# Patient Record
Sex: FEMALE | Race: WHITE | Employment: FULL TIME | URBAN - METROPOLITAN AREA
[De-identification: names, ages, dates, MRNs, and addresses within clinical notes are randomized per-mention and may not be internally consistent; named-entity substitution may affect disease eponyms.]

---

## 2017-03-13 LAB — TYPE, ABO & RH, EXTERNAL: NORMAL

## 2017-11-13 LAB
HBSAG, EXTERNAL: NEGATIVE
HIV, EXTERNAL: NEGATIVE
RUBELLA, EXTERNAL: POSITIVE

## 2018-01-01 ENCOUNTER — APPOINTMENT (OUTPATIENT)
Dept: GENERAL RADIOLOGY | Age: 28
End: 2018-01-01
Attending: EMERGENCY MEDICINE
Payer: COMMERCIAL

## 2018-01-01 ENCOUNTER — HOSPITAL ENCOUNTER (EMERGENCY)
Age: 28
Discharge: HOME | End: 2018-01-01
Attending: EMERGENCY MEDICINE
Payer: COMMERCIAL

## 2018-01-01 VITALS
HEIGHT: 67 IN | SYSTOLIC BLOOD PRESSURE: 159 MMHG | BODY MASS INDEX: 21.97 KG/M2 | OXYGEN SATURATION: 100 % | HEART RATE: 73 BPM | TEMPERATURE: 97.6 F | RESPIRATION RATE: 16 BRPM | DIASTOLIC BLOOD PRESSURE: 76 MMHG | WEIGHT: 140 LBS

## 2018-01-01 DIAGNOSIS — S61.412A LACERATION OF LEFT HAND, FOREIGN BODY PRESENCE UNSPECIFIED, INITIAL ENCOUNTER: Primary | ICD-10-CM

## 2018-01-01 PROCEDURE — 77030018836 HC SOL IRR NACL ICUM -A

## 2018-01-01 PROCEDURE — 75810000293 HC SIMP/SUPERF WND  RPR

## 2018-01-01 PROCEDURE — 73130 X-RAY EXAM OF HAND: CPT

## 2018-01-01 PROCEDURE — 74011000250 HC RX REV CODE- 250: Performed by: PHYSICIAN ASSISTANT

## 2018-01-01 PROCEDURE — 99282 EMERGENCY DEPT VISIT SF MDM: CPT

## 2018-01-01 PROCEDURE — 77030031132 HC SUT NYL COVD -A

## 2018-01-01 RX ORDER — CEPHALEXIN 500 MG/1
500 CAPSULE ORAL 3 TIMES DAILY
Qty: 21 CAP | Refills: 0 | Status: SHIPPED | OUTPATIENT
Start: 2018-01-01 | End: 2018-01-08

## 2018-01-01 RX ADMIN — Medication 2 ML: at 17:31

## 2018-01-01 NOTE — ED TRIAGE NOTES
Pt cut her left hand/ left lower second finger with a steak knife. Controlled bleeding noted. Pt states she is 16 weeks pregnant.

## 2018-01-01 NOTE — LETTER
[facility] 
[facility] EMERGENCY DEPT 
[address] [id number] 
[phone] Work/School Note Date: 1/1/2018 To Whom It May concern: 
 
Daria Stahl was seen and treated today in the emergency room by the following provider(s): 
Attending Provider: Darlene Johnson MD 
Physician Assistant: LACY Khoury. Daria Stahl may return to work on 1/11/18 or when sutures are removed.  
 
Sincerely, 
 
 
 
 
LACY Khoury

## 2018-01-01 NOTE — ED NOTES
Patient given discharge instruction by [facility] Adriana CARABALLO. Verbalized understanding, pt discharge home with family. Non stick dressing applied to the left hand.

## 2018-01-01 NOTE — ED PROVIDER NOTES
HPI Comments: Genaro Urrutia is a 24 y.o. female  who presents by private vehicle to ER with c/o laceration. Patient was trying to wedge something out of her kids toy using a steak knife and stabbed her left hand below the 2nd finger on her palm. Patient is UTD on immunizations. Ptient is 16 weeks pregnant, denies any pregnancy complaints. She specifically denies any fevers, chills, nausea, vomiting, chest pain, shortness of breath, headache, rash, diarrhea, abdominal pain, urinary/bowel changes, sweating or weight loss. PCP: Ela Sagastume MD   PMHx significant for: Past Medical History:  No date: HX OTHER MEDICAL      Comment: jaw surgery; bone graft in jaw   PSHx significant for: Past Surgical History:  No date: HX APPENDECTOMY  Social Hx: Tobacco use: Smoking status: Never Smoker                                                              Smokeless status: Never Used                      ; EtOH use: The patient states she drinks 0 per week.; Illicit Drug use: Allergies:   -- Morphine -- Nausea and Vomiting    There are no other complaints, changes or physical findings at this time. The history is provided by the patient.         Past Medical History:   Diagnosis Date    HX OTHER MEDICAL     jaw surgery; bone graft in jaw       Past Surgical History:   Procedure Laterality Date    HX APPENDECTOMY           Family History:   Problem Relation Age of Onset    Hypertension Paternal Grandfather        Social History     Social History    Marital status:      Spouse name: Laura Linda Number of children: 0    Years of education: N/A     Occupational History    Student           Social History Main Topics    Smoking status: Never Smoker    Smokeless tobacco: Never Used    Alcohol use No    Drug use: No    Sexual activity: Yes     Partners: Male     Other Topics Concern    Not on file     Social History Narrative         ALLERGIES: Morphine    Review of Systems Constitutional: Negative. HENT: Negative. Eyes: Negative. Respiratory: Negative. Cardiovascular: Negative. Gastrointestinal: Negative. Endocrine: Negative. Genitourinary: Negative. Musculoskeletal: Negative. Skin: Positive for wound. Allergic/Immunologic: Negative. Neurological: Negative. Hematological: Negative. Psychiatric/Behavioral: Negative. All other systems reviewed and are negative. Vitals:    01/01/18 1652   BP: 159/76   Pulse: 73   Resp: 16   Temp: 97.6 °F (36.4 °C)   SpO2: 100%   Weight: 63.5 kg (140 lb)   Height: 5' 7\" (1.702 m)            Physical Exam   Constitutional: She is oriented to person, place, and time. She appears well-developed. HENT:   Head: Normocephalic and atraumatic. Right Ear: External ear normal.   Left Ear: External ear normal.   Nose: Nose normal.   Mouth/Throat: Oropharynx is clear and moist. No oropharyngeal exudate. Eyes: Conjunctivae, EOM and lids are normal. Right eye exhibits no discharge. Left eye exhibits no discharge. Neck: Normal range of motion. No tracheal deviation present. No thyromegaly present. Cardiovascular: Normal rate, regular rhythm, normal heart sounds and intact distal pulses. Pulmonary/Chest: Effort normal and breath sounds normal.   Abdominal: Soft. Normal appearance and bowel sounds are normal.   Musculoskeletal: Normal range of motion. Hands:  Neurological: She is alert and oriented to person, place, and time. Skin: Skin is warm and dry. Psychiatric: She has a normal mood and affect. Judgment normal.        MDM  Number of Diagnoses or Management Options  Laceration of left hand, foreign body presence unspecified, initial encounter:   Diagnosis management comments: Assesment/Plan- 24 y.o. No chief complaint on file. differential includes: laceration. imaging reviewed with no acute findings. Wound approximated with sutures. Recommend PCP follow up.  Patient educated on reasons to return to the ED. Amount and/or Complexity of Data Reviewed  Tests in the radiology section of CPT®: reviewed and ordered      ED Course       Wound Repair  Date/Time: 1/2/2018 12:50 AM  [id number]: Chapito De Paz  Preparation: skin prepped with Shur-Clens  Pre-procedure re-eval: Immediately prior to the procedure, the patient was reevaluated and found suitable for the planned procedure and any planned medications. Time out: Immediately prior to the procedure a time out was called to verify the correct patient, procedure, equipment, staff and marking as appropriate. .  Location details: left hand  Wound length:2.5 cm or less  Anesthesia: local infiltration    Anesthesia:  Local Anesthetic: LET (lido,epi,tetracaine)  Anesthetic total: 2 mL  Foreign bodies: no foreign bodies  Irrigation solution: saline  Irrigation method: syringe  Debridement: none  Skin closure: 5-0 nylon  Number of sutures: 3  Technique: simple  Approximation: close  Dressing: 4x4  Patient tolerance: Patient tolerated the procedure well with no immediate complications  My total time at bedside, performing this procedure was 16-30 minutes.

## 2018-01-01 NOTE — DISCHARGE INSTRUCTIONS
Cuts on the Hand Closed With Stitches: Care Instructions  Your Care Instructions    A cut on your hand can be on your fingers, your thumb, or the front or back of your hand. Sometimes a cut can injure the tendons, blood vessels, or nerves of your hand. The doctor used stitches to close the cut. Using stitches also helps the cut heal and reduces scarring. The doctor may have given you a splint to help prevent you from moving your hand, fingers, or thumb. If the cut went deep and through the skin, the doctor put in two layers of stitches. The deeper layer brings the deep part of the cut together. These stitches will dissolve and don't need to be removed. The stitches in the upper layer are the ones you see on the cut. You will probably have a bandage. You will need to have the stitches removed, usually in 7 to 14 days. The doctor may suggest that you see a hand specialist if the cut is very deep or if you have trouble moving your fingers or have less feeling in your hand. The doctor has checked you carefully, but problems can develop later. If you notice any problems or new symptoms, get medical treatment right away. Follow-up care is a key part of your treatment and safety. Be sure to make and go to all appointments, and call your doctor if you are having problems. It's also a good idea to know your test results and keep a list of the medicines you take. How can you care for yourself at home? · Keep the cut dry for the first 24 to 48 hours. After this, you can shower if your doctor okays it. Pat the cut dry. · Don't soak the cut, such as in a bathtub. Your doctor will tell you when it's safe to get the cut wet. · If your doctor told you how to care for your cut, follow your doctor's instructions. If you did not get instructions, follow this general advice:  ¨ After the first 24 to 48 hours, wash around the cut with clean water 2 times a day.  Don't use hydrogen peroxide or alcohol, which can slow healing. ¨ You may cover the cut with a thin layer of petroleum jelly, such as Vaseline, and a nonstick bandage. ¨ Apply more petroleum jelly and replace the bandage as needed. · Prop up the sore hand on a pillow anytime you sit or lie down during the next 3 days. Try to keep it above the level of your heart. This will help reduce swelling. · Avoid any activity that could cause your cut to reopen. · Do not remove the stitches on your own. Your doctor will tell you when to come back to have the stitches removed. · Be safe with medicines. Take pain medicines exactly as directed. ¨ If the doctor gave you a prescription medicine for pain, take it as prescribed. ¨ If you are not taking a prescription pain medicine, ask your doctor if you can take an over-the-counter medicine. When should you call for help? Call your doctor now or seek immediate medical care if:  ? · You have new pain, or your pain gets worse. ? · The skin near the cut is cold or pale or changes color. ? · You have tingling, weakness, or numbness near the cut.   ? · The cut starts to bleed, and blood soaks through the bandage. Oozing small amounts of blood is normal.   ? · You have trouble moving the area of the hand near the cut.   ? · You have symptoms of infection, such as:  ¨ Increased pain, swelling, warmth, or redness around the cut. ¨ Red streaks leading from the cut. ¨ Pus draining from the cut. ¨ A fever. ? Watch closely for changes in your health, and be sure to contact your doctor if:  ? · You do not get better as expected. Where can you learn more? Go to http://burke-armando.info/. Enter T250 in the search box to learn more about \"Cuts on the Hand Closed With Stitches: Care Instructions. \"  Current as of: March 20, 2017  Content Version: 11.4  © 6582-1763 [employer]  Care instructions adapted under license by [employer] (which disclaims liability or warranty for this information). If you have questions about a medical condition or this instruction, always ask your healthcare professional. [address] any warranty or liability for your use of this information. We hope that we have addressed all of your medical concerns. The examination and treatment you received in the Emergency Department were for an emergent problem and were not intended as complete care. It is important that you follow up with your healthcare provider(s) for ongoing care. If your symptoms worsen or do not improve as expected, and you are unable to reach your usual health care provider(s), you should return to the Emergency Department. Today's healthcare is undergoing tremendous change, and patient satisfaction surveys are one of the many tools to assess the quality of medical care. You may receive a survey from the [employer] regarding your experience in the Emergency Department. I hope that your experience has been completely positive, particularly the medical care that I provided. As such, please participate in the survey; anything less than excellent does not meet my expectations or intentions. [facility] and [facility] participate in nationally recognized quality of care measures. If your blood pressure is greater than 120/80, as reported below, we urge that you seek medical care to address the potential of high blood pressure, commonly known as hypertension. Hypertension can be hereditary or can be caused by certain medical conditions, pain, stress, or \"white coat syndrome. \"       Please make an appointment with your health care provider(s) for follow up of your Emergency Department visit. VITALS:   Patient Vitals for the past 8 hrs:   Temp Pulse Resp BP SpO2   01/01/18 1652 97.6 °F (36.4 °C) 73 16 159/76 100 %          Thank you for allowing us to provide you with medical care today.   We realize that you have many choices for your emergency care needs. Please choose us in the future for any continued health care needs. Adelina Berger, [address] Vandana Singh: [phone]            No results found for this or any previous visit (from the past 24 hour(s)). Xr Hand Lt Min 3 V    Result Date: 1/1/2018  EXAM:  XR HAND LT MIN 3 V INDICATION:  Knife went into the left hand. Evaluation for open fracture. COMPARISON: None. FINDINGS: Three views of the left hand demonstrate no fracture, dislocation or other acute osseous or articular abnormality. The soft tissues are within normal limits. There is no evidence for retained radiopaque foreign body. IMPRESSION:  No evidence for an open fracture.

## 2018-03-27 LAB
ANTIBODY SCREEN, EXTERNAL: NEGATIVE
T. PALLIDUM, EXTERNAL: NORMAL

## 2018-04-10 ENCOUNTER — HOSPITAL ENCOUNTER (OUTPATIENT)
Age: 28
Setting detail: OBSERVATION
Discharge: HOME | End: 2018-04-11
Attending: OBSTETRICS & GYNECOLOGY | Admitting: OBSTETRICS & GYNECOLOGY
Payer: COMMERCIAL

## 2018-04-10 LAB
APPEARANCE UR: CLEAR
BACTERIA URNS QL MICRO: NEGATIVE /HPF
BILIRUB UR QL: NEGATIVE
COLOR UR: NORMAL
EPITH CASTS URNS QL MICRO: NORMAL /LPF
GLUCOSE UR STRIP.AUTO-MCNC: NEGATIVE MG/DL
HGB UR QL STRIP: NEGATIVE
HYALINE CASTS URNS QL MICRO: NORMAL /LPF (ref 0–5)
KETONES UR QL STRIP.AUTO: NEGATIVE MG/DL
LEUKOCYTE ESTERASE UR QL STRIP.AUTO: NEGATIVE
NITRITE UR QL STRIP.AUTO: NEGATIVE
PH UR STRIP: 7 [PH] (ref 5–8)
PROT UR STRIP-MCNC: NEGATIVE MG/DL
RBC #/AREA URNS HPF: NORMAL /HPF (ref 0–5)
SP GR UR REFRACTOMETRY: 1.02 (ref 1–1.03)
UA: UC IF INDICATED,UAUC: NORMAL
UROBILINOGEN UR QL STRIP.AUTO: 0.2 EU/DL (ref 0.2–1)
WBC URNS QL MICRO: NORMAL /HPF (ref 0–4)

## 2018-04-10 PROCEDURE — 81001 URINALYSIS AUTO W/SCOPE: CPT | Performed by: OBSTETRICS & GYNECOLOGY

## 2018-04-10 PROCEDURE — 99218 HC RM OBSERVATION: CPT

## 2018-04-10 PROCEDURE — 96360 HYDRATION IV INFUSION INIT: CPT

## 2018-04-10 PROCEDURE — 74011250636 HC RX REV CODE- 250/636: Performed by: OBSTETRICS & GYNECOLOGY

## 2018-04-10 RX ORDER — TERBUTALINE SULFATE 1 MG/ML
0.25 INJECTION SUBCUTANEOUS
Status: COMPLETED | OUTPATIENT
Start: 2018-04-11 | End: 2018-04-11

## 2018-04-10 RX ORDER — SODIUM CHLORIDE, SODIUM LACTATE, POTASSIUM CHLORIDE, CALCIUM CHLORIDE 600; 310; 30; 20 MG/100ML; MG/100ML; MG/100ML; MG/100ML
500 INJECTION, SOLUTION INTRAVENOUS ONCE
Status: COMPLETED | OUTPATIENT
Start: 2018-04-10 | End: 2018-04-10

## 2018-04-10 RX ADMIN — SODIUM CHLORIDE, SODIUM LACTATE, POTASSIUM CHLORIDE, AND CALCIUM CHLORIDE 999 ML: 600; 310; 30; 20 INJECTION, SOLUTION INTRAVENOUS at 23:16

## 2018-04-10 NOTE — IP AVS SNAPSHOT
[facility] 
 
 
 [address] 
[phone] Patient: Fransisco Paulson MRN: [id number] UCK:[birth year] A check malorie indicates which time of day the medication should be taken. My Medications ASK your doctor about these medications Instructions Each Dose to Equal  
 Morning Noon Evening Bedtime PNV38-Iron Cbn&Gluc-FA-DSS-DHA 35-1- mg Cmpk Take 1 Tab by mouth. 1 Tab

## 2018-04-10 NOTE — IP AVS SNAPSHOT
[address] [facility] 
 
 
 [address] [place] [address] 
[phone] Patient: Richardson Argueta MRN: [id number] IBH:[id number]/[id number] About your hospitalization You were admitted on:  N/A You last received care in the:  [facility] LABOR & DELIVERY You were discharged on:  April 11, 2018 Why you were hospitalized Your primary diagnosis was:  Not on File Follow-up Information Follow up With Details Comments Contact Info Galo Gallagher MD   [address] [facility] [address] [place] [address] 
[phone] Melissa Sue MD Go today for routine follow up appointment. Call office overnight if contractions begin regularly again. [address] 
[phone] Discharge Orders None A check malorie indicates which time of day the medication should be taken. My Medications ASK your doctor about these medications Instructions Each Dose to Equal  
 Morning Noon Evening Bedtime PNV38-Iron Cbn&Gluc-FA-DSS-DHA 35-1- mg Cmpk Take 1 Tab by mouth. 1 Tab Discharge Instructions Weeks 30 to 32 of Your Pregnancy: Care Instructions Your Care Instructions You have made it to the final months of your pregnancy. By now, your baby is really starting to look like a baby, with hair and plump skin. As you enter the final weeks of pregnancy, the reality of having a baby may start to set in. This is the time to settle on a name, get your household in order, set up a safe nursery, and find quality  if needed. Doing these things in advance will allow you to focus on caring for and enjoying your new baby.  You may also want to have a tour of your hospital's labor and delivery unit to get a better idea of what to expect while you are in the hospital. 
 During these last months, it is very important to take good care of yourself and pay attention to what your body needs. If your doctor says it is okay for you to work, don't push yourself too hard. Use the tips provided in this care sheet to ease heartburn and care for varicose veins. If you haven't already had the Tdap shot during this pregnancy, talk to your doctor about getting it. It will help protect your  against pertussis infection. Follow-up care is a key part of your treatment and safety. Be sure to make and go to all appointments, and call your doctor if you are having problems. It's also a good idea to know your test results and keep a list of the medicines you take. How can you care for yourself at home? Pay attention to your baby's movements · You should feel your baby move several times every day. · Your baby now turns less, and kicks and jabs more. · Your baby sleeps 20 to 45 minutes at a time and is more active at certain times of day. · If your doctor wants you to count your baby's kicks: 
¨ Empty your bladder, and lie on your side or relax in a comfortable chair. ¨ Write down your start time. ¨ Pay attention only to your baby's movements. Count any movement except hiccups. ¨ After you have counted 10 movements, write down your stop time. ¨ Write down how many minutes it took for your baby to move 10 times. ¨ If an hour goes by and you have not recorded 10 movements, have something to eat or drink and then count for another hour. If you do not record 10 movements in either hour, call your doctor. Ease heartburn · Eat small, frequent meals. · Do not eat chocolate, peppermint, or very spicy foods. Avoid drinks with caffeine, such as coffee, tea, and sodas. · Avoid bending over or lying down after meals. · Talk a short walk after you eat. · If heartburn is a problem at night, do not eat for 2 hours before bedtime. · Take antacids like Mylanta, Maalox, Rolaids, or Tums. Do not take antacids that have sodium bicarbonate. Care for varicose veins · Varicose veins are blood vessels that stretch out with the extra blood during pregnancy. Your legs may ache or throb. Most varicose veins will go away after the birth. · Avoid standing for long periods of time. Sit with your legs crossed at the ankles, not the knees. · Sit with your feet propped up. · Avoid tight clothing or stockings. Wear support hose. · Exercise regularly. Try walking for at least 30 minutes a day. Where can you learn more? Go to http://burke-armando.info/. Enter N569 in the search box to learn more about \"Weeks 30 to 32 of Your Pregnancy: Care Instructions. \" Current as of: 2017 Content Version: 11.4 © 2850-7532 [employer]. Care instructions adapted under license by World[employer]inger (which disclaims liability or warranty for this information). If you have questions about a medical condition or this instruction, always ask your healthcare professional. [place] [address] any warranty or liability for your use of this information.  Labor: Care Instructions Your Care Instructions  labor is the start of labor between 20 and 36 weeks of pregnancy. A full-term pregnancy lasts 37 to 42 weeks. In labor, the uterus contracts to open the cervix. This is the first stage of childbirth.  labor can be caused by a problem with the baby, the mother, or both. Often the cause is not known. In some cases, doctors use medicines to try to delay labor until 29 or more weeks of pregnancy. By this time, a baby has grown enough so that problems are not likely. In some cases-such as with a serious infection-it is healthier for the baby to be born early. Your treatment will depend on how far along you are in your pregnancy and on your health and your baby's health. Follow-up care is a key part of your treatment and safety. Be sure to make and go to all appointments, and call your doctor if you are having problems. It's also a good idea to know your test results and keep a list of the medicines you take. How can you care for yourself at home? · If your doctor prescribed medicines, take them exactly as directed. Call your doctor if you think you are having a problem with your medicine. · Rest until your doctor advises you about activity. He or she will tell you if you should stay in bed most of the time. You may need to arrange for  if you have young children. · Do not have sexual intercourse unless your doctor says it is safe. · Use pads, not tampons, if you have vaginal bleeding. · Make sure to drink plenty of fluids. Dehydration can lead to contractions. If you have kidney, heart, or liver disease and have to limit fluids, talk with your doctor before you increase the amount of fluids you drink. · Do not smoke or allow others to smoke around you. If you need help quitting, talk to your doctor about stop-smoking programs and medicines. These can increase your chances of quitting for good. When should you call for help? Call 911 anytime you think you may need emergency care. For example, call if: 
? · You passed out (lost consciousness). ? · You have severe vaginal bleeding. ? · You have severe pain in your belly or pelvis. ? · You have had fluid gushing or leaking from your vagina and you know or think the umbilical cord is bulging into your vagina. If this happens, immediately get down on your knees so your rear end (buttocks) is higher than your head. This will decrease the pressure on the cord until help arrives. ?Call your doctor now or seek immediate medical care if: 
? · You have signs of preeclampsia, such as: 
¨ Sudden swelling of your face, hands, or feet. ¨ New vision problems (such as dimness or blurring). ¨ A severe headache. ? · You have any vaginal bleeding. ? · You have belly pain or cramping. ? · You have a fever. ? · You have had regular contractions (with or without pain) for an hour. This means that you have 6 or more within 1 hour after you change your position and drink fluids. ? · You have a sudden release of fluid from the vagina. ? · You have low back pain or pelvic pressure that does not go away. ? · You notice that your baby has stopped moving or is moving much less than normal. ? Watch closely for changes in your health, and be sure to contact your doctor if you have any problems. Where can you learn more? Go to http://burke-armando.info/. Enter Q400 in the search box to learn more about \" Labor: Care Instructions. \" Current as of: 2017 Content Version: 11.4 © 0917-8578 [email or web address]. Care instructions adapted under license by Peak (which disclaims liability or warranty for this information). If you have questions about a medical condition or this instruction, always ask your healthcare professional. [place] [address] any warranty or liability for your use of this information. Pandya Browner Contractions: Care Instructions Your Care Instructions Berks Tuttle contractions prepare your uterus for labor. Think of them as a \"warm-up\" exercise that your body does. You may begin to feel them between the 28th and 30th weeks of your pregnancy. But they start as early as the 20th week. Berks Tuttle contractions usually occur more often during the ninth month. They may go away when you are active and return when you rest. These contractions are like mild contractions of true labor, but they occur less often. (You feel fewer than 8 in an hour.) They don't cause your cervix to open. It may be hard for you to tell the difference between Pandya Browner contractions and true labor, especially in your first pregnancy. Follow-up care is a key part of your treatment and safety. Be sure to make and go to all appointments, and call your doctor if you are having problems. It's also a good idea to know your test results and keep a list of the medicines you take. How can you care for yourself at home? · Try a warm bath to help relieve muscle tension and reduce pain. · Change positions every 30 minutes. Take breaks if you must sit for a long time. Get up and walk around. · Drink plenty of water, enough so that your urine is light yellow or clear like water. · Taking short walks may help you feel better. Your doctor needs to check any contractions that are getting stronger or closer together. Where can you learn more? Go to http://burke-armando.info/. Enter 620 [id number] in the search box to learn more about \"Hendricks Tuttle Contractions: Care Instructions. \" Current as of: 2017 Content Version: 11.4 © 0044-5048 Anim[employer]. Care instructions adapted under license by Drybar (which disclaims liability or warranty for this information). If you have questions about a medical condition or this instruction, always ask your healthcare professional. [place] [address] any warranty or liability for your use of this information. Pregnancy Precautions: Care Instructions Your Care Instructions There is no sure way to prevent labor before your due date ( labor) or to prevent most other pregnancy problems. But there are things you can do to increase your chances of a healthy pregnancy. Go to your appointments, follow your doctor's advice, and take good care of yourself. Eat well, and exercise (if your doctor agrees). And make sure to drink plenty of water. Follow-up care is a key part of your treatment and safety.  Be sure to make and go to all appointments, and call your doctor if you are having problems. It's also a good idea to know your test results and keep a list of the medicines you take. How can you care for yourself at home? · Make sure you go to your prenatal appointments. At each visit, your doctor will check your blood pressure. Your doctor will also check to see if you have protein in your urine. High blood pressure and protein in urine are signs of preeclampsia. This condition can be dangerous for you and your baby. · Drink plenty of fluids, enough so that your urine is light yellow or clear like water. Dehydration can cause contractions. If you have kidney, heart, or liver disease and have to limit fluids, talk with your doctor before you increase the amount of fluids you drink. · Tell your doctor right away if you notice any symptoms of an infection, such as: ¨ Burning when you urinate. ¨ A foul-smelling discharge from your vagina. ¨ Vaginal itching. ¨ Unexplained fever. ¨ Unusual pain or soreness in your uterus or lower belly. · Eat a balanced diet. Include plenty of foods that are high in calcium and iron. ¨ Foods high in calcium include milk, cheese, yogurt, almonds, and broccoli. ¨ Foods high in iron include red meat, shellfish, poultry, eggs, beans, raisins, whole-grain bread, and leafy green vegetables. · Do not smoke. If you need help quitting, talk to your doctor about stop-smoking programs and medicines. These can increase your chances of quitting for good. · Do not drink alcohol or use illegal drugs. · Follow your doctor's directions about activity. Your doctor will let you know how much, if any, exercise you can do. · Ask your doctor if you can have sex. If you are at risk for early labor, your doctor may ask you to not have sex. · Take care to prevent falls. During pregnancy, your joints are loose, and your balance is off. Sports such as bicycling, skiing, or in-line skating can increase your risk of falling.  And don't ride horses or motorcycles, dive, water ski, scuba dive, or parachute jump while you are pregnant. · Avoid getting very hot. Do not use saunas or hot tubs. Avoid staying out in the sun in hot weather for long periods. Take acetaminophen (Tylenol) to lower a high fever. · Do not take any over-the-counter or herbal medicines or supplements without talking to your doctor or pharmacist first. 
When should you call for help? Call 911 anytime you think you may need emergency care. For example, call if: 
? · You passed out (lost consciousness). ? · You have severe vaginal bleeding. ? · You have severe pain in your belly or pelvis. ? · You have had fluid gushing or leaking from your vagina and you know or think the umbilical cord is bulging into your vagina. If this happens, immediately get down on your knees so your rear end (buttocks) is higher than your head. This will decrease the pressure on the cord until help arrives. ?Call your doctor now or seek immediate medical care if: 
? · You have signs of preeclampsia, such as: 
¨ Sudden swelling of your face, hands, or feet. ¨ New vision problems (such as dimness or blurring). ¨ A severe headache. ? · You have any vaginal bleeding. ? · You have belly pain or cramping. ? · You have a fever. ? · You have had regular contractions (with or without pain) for an hour. This means that you have 8 or more within 1 hour or 4 or more in 20 minutes after you change your position and drink fluids. ? · You have a sudden release of fluid from your vagina. ? · You have low back pain or pelvic pressure that does not go away. ? · You notice that your baby has stopped moving or is moving much less than normal. ? Watch closely for changes in your health, and be sure to contact your doctor if you have any problems. Where can you learn more? Go to http://burke-armando.info/. Enter [id number] in the search box to learn more about \"Pregnancy Precautions: Care Instructions. \" 
 Current as of: March 16, 2017 Content Version: 11.4 © 4991-6714 Healthwise, Tuolar.com. Care instructions adapted under license by [employer] (which disclaims liability or warranty for this information). If you have questions about a medical condition or this instruction, always ask your healthcare professional. [address] any warranty or liability for your use of this information. Introducing [facility]! [employer] introduces [employer] patient portal. Now you can access parts of your medical record, email your doctor's office, and request medication refills online. 1. In your internet browser, go to [email or web address] 2. Click on the First Time User? Click Here link in the Sign In box. You will see the New Member Sign Up page. 3. Enter your [employer] Access Code exactly as it appears below. You will not need to use this code after youve completed the sign-up process. If you do not sign up before the expiration date, you must request a new code. · [employer] Access Code: [email or web address] Expires: 7/10/2018  1:00 AM 
 
4. Enter the last four digits of your Social Security Number (xxxx) and Date of Birth (mm/dd/yyyy) as indicated and click Submit. You will be taken to the next sign-up page. 5. Create a [employer] ID. This will be your [employer] login ID and cannot be changed, so think of one that is secure and easy to remember. 6. Create a [employer] password. You can change your password at any time. 7. Enter your Password Reset Question and Answer. This can be used at a later time if you forget your password. 8. Enter your e-mail address. You will receive e-mail notification when new information is available in [address] 9. Click Sign Up. You can now view and download portions of your medical record. 10. Click the Download Summary menu link to download a portable copy of your medical information. If you have questions, please visit the Frequently Asked Questions section of the MyChart website. Remember, Massively Funt is NOT to be used for urgent needs. For medical emergencies, dial 911. Now available from your iPhone and Android! Introducing Filemon Anderson As a [employer] patient, I wanted to make you aware of our electronic visit tool called Filemon Anderson. [employer] 24/7 allows you to connect within minutes with a medical provider 24 hours a day, seven days a week via a mobile device or tablet or logging into a secure website from your computer. You can access Filemon Anderson from anywhere in the [place]. A virtual visit might be right for you when you have a simple condition and feel like you just dont want to get out of bed, or cant get away from work for an appointment, when your regular [employer] provider is not available (evenings, weekends or holidays), or when youre out of town and need minor care. Electronic visits cost only $49 and if the [employer] 24/7 provider determines a prescription is needed to treat your condition, one can be electronically transmitted to a nearby pharmacy*. Please take a moment to enroll today if you have not already done so. The enrollment process is free and takes just a few minutes. To enroll, please download the [employer] 24/7 kandi to your tablet or phone, or visit [email or web address] to enroll on your computer. And, as an [address] [place] [address] patient with a Blitsy account, the results of your visits will be scanned into your electronic medical record and your primary care provider will be able to view the scanned results. We urge you to continue to see your regular [employer] provider for your ongoing medical care.   And while your primary care provider may not be the one available when you seek a Filemon Anderson virtual visit, the peace of mind you get from getting a real diagnosis real time can be priceless. For more information on Filemon Anderson, view our Frequently Asked Questions (FAQs) at [email or web address]. Sincerely, 
 
Rodrigo Field MD 
Chief Medical Officer [address] *:  certain medications cannot be prescribed via Filemon Anderson Providers Seen During Your Hospitalization Provider Specialty Primary office phone Nathalia Rothman MD Obstetrics & Gynecology [phone] Your Primary Care Physician (PCP) Primary Care Physician Office Phone Office Fax Yu Gracia [phone] You are allergic to the following Allergen Reactions Morphine Nausea and Vomiting Recent Documentation Breastfeeding? OB Status Smoking Status No Pregnant Never Smoker Emergency Contacts Name Discharge Info Relation Home Work Mobile Nimesh Hensley DISCHARGE CAREGIVER [3] Spouse [3] [phone] Patient Belongings The following personal items are in your possession at time of discharge: 
  Dental Appliances: None  Visual Aid: Contacts      Home Medications: None   Jewelry: None  Clothing: At bedside    Other Valuables: None Please provide this summary of care documentation to your next provider. Signatures-by signing, you are acknowledging that this After Visit Summary has been reviewed with you and you have received a copy. Patient Signature:  ____________________________________________________________ Date:  ____________________________________________________________  
  
[facility] Provider Signature:  ____________________________________________________________ Date:  ____________________________________________________________

## 2018-04-11 VITALS
DIASTOLIC BLOOD PRESSURE: 66 MMHG | SYSTOLIC BLOOD PRESSURE: 118 MMHG | TEMPERATURE: 98.2 F | OXYGEN SATURATION: 98 % | RESPIRATION RATE: 16 BRPM | HEART RATE: 78 BPM

## 2018-04-11 PROBLEM — O47.9 IRREGULAR CONTRACTIONS: Status: ACTIVE | Noted: 2018-04-11

## 2018-04-11 LAB — FIBRONECTIN FETAL VAG QL: NEGATIVE

## 2018-04-11 PROCEDURE — 82731 ASSAY OF FETAL FIBRONECTIN: CPT | Performed by: OBSTETRICS & GYNECOLOGY

## 2018-04-11 PROCEDURE — 74011250636 HC RX REV CODE- 250/636: Performed by: OBSTETRICS & GYNECOLOGY

## 2018-04-11 PROCEDURE — 96372 THER/PROPH/DIAG INJ SC/IM: CPT

## 2018-04-11 PROCEDURE — 59025 FETAL NON-STRESS TEST: CPT

## 2018-04-11 PROCEDURE — 99285 EMERGENCY DEPT VISIT HI MDM: CPT

## 2018-04-11 PROCEDURE — 99218 HC RM OBSERVATION: CPT

## 2018-04-11 RX ADMIN — TERBUTALINE SULFATE 0.25 MG: 1 INJECTION SUBCUTANEOUS at 00:05

## 2018-04-11 NOTE — H&P
History & Physical    Name: Marcelle Plascencia MRN: [id number]  SSN: [id number]    YOB: 1990  Age: 34 y.o. Sex: female        Subjective:     Estimated Date of Delivery: 18  OB History      Para Term  AB Living    2 1 1   1    SAB TAB Ectopic Molar Multiple Live Births         1          Ms. Jeffery Mills presents with pregnancy at 30w0d for decrease fetal movement. She reports feeling occasional braxtons blum; denies any vaginal bleeding, leakage of fluid. She reports working a long day today. Prenatal course was normal. Please see prenatal records for details. Past Medical History:   Diagnosis Date    HX OTHER MEDICAL     jaw surgery; bone graft in jaw     Past Surgical History:   Procedure Laterality Date    HX APPENDECTOMY       Social History     Occupational History    Student           Social History Main Topics    Smoking status: Never Smoker    Smokeless tobacco: Never Used    Alcohol use No    Drug use: No    Sexual activity: Yes     Partners: Male     Family History   Problem Relation Age of Onset    Hypertension Paternal Grandfather        Allergies   Allergen Reactions    Morphine Nausea and Vomiting     Prior to Admission medications    Medication Sig Start Date End Date Taking? Authorizing Provider   PNV38-Iron Cbn&Gluc-FA-DSS-DHA 35-1- mg cmpk Take 1 Tab by mouth. Phys Other, MD        A comprehensive review of systems was negative except for that written in the HPI. Objective:     Vitals: There were no vitals filed for this visit.      Physical Exam  Gen: NAD  Pulm: CTA B/L  CV: S1S2  Abd: Gravid,soft, NT  Pelvic: NO VB  Cervical Exam: closed/thick/high  Uterine Activity: +contractions  Membranes: Intact  Fetal Heart Rate: Reactive     Prenatal Labs:   Lab Results   Component Value Date/Time    Rubella, External Immune 2013    GrBStrep, External Negative 2013    HBsAg, External Negative 2013    HIV, External Non-reactive 2013    Gonorrhea, External Negative 2013    Chlamydia, External Negative 2013        Impression/Plan: 30.0 weeks gestation, Decrease Fetal Movement,  contractions     Plan: Reactive  NST. Patient reports feeling and noticing fetal movements. Encourage increase hydration. FFN negative, Maintain scheduled OB appointments.      Signed By:  Doc Burns MD     April 10, 2018

## 2018-04-11 NOTE — PROGRESS NOTES
2150: patient arrived ambulatory to floor, sates that she has had some fetal movement, but it has been significantly decreased over the last few days, states that she has had some contractions as well and just an \"overall crampy\" feeling. Denies leaking of fluid or bleeding. Patient state that she has been working and on her feet a lot over the last few days as well. 2230: called Dmitry MELVIN to update on patient - per MD continue to monitor and MD will be down to see patient shortly. 2300: Dmitry MELVIN at bedside discussing history with patient, per MD - give 500 ml LR bolus, continue monitoring. Will check cervix before patient leaves. 2350: per MD - give 1 dose 0.25 terbutaline and reevaluate in 20 minutes  0005: Dmitry MELVIN at bedside for assessment, SVE 0/40/floating, FFN sent to lab per MD order. 0050: lab called, FFN negative, confirmed by Belem Correa RN.   5502: spoke Jose Otoole MD - per MD TORJOHNY - patient may be discharged home with work modifications, rest, and adequate hydration. Continue to follow up with MD tomorrow morning. 0115: discharge instructions above reviewed with patient, questions thoroughly answered and patient discharged home to self care.

## 2018-05-21 LAB — GRBS, EXTERNAL: NEGATIVE

## 2018-05-24 ENCOUNTER — HOSPITAL ENCOUNTER (EMERGENCY)
Age: 28
Discharge: HOME | End: 2018-05-25
Attending: OBSTETRICS & GYNECOLOGY | Admitting: OBSTETRICS & GYNECOLOGY
Payer: COMMERCIAL

## 2018-05-24 LAB
APPEARANCE UR: CLEAR
BACTERIA URNS QL MICRO: NEGATIVE /HPF
BILIRUB UR QL: NEGATIVE
COLOR UR: NORMAL
DAILY QC (YES/NO)?: YES
EPITH CASTS URNS QL MICRO: NORMAL /LPF
GLUCOSE UR STRIP.AUTO-MCNC: NEGATIVE MG/DL
HGB UR QL STRIP: NEGATIVE
HYALINE CASTS URNS QL MICRO: NORMAL /LPF (ref 0–5)
KETONES UR QL STRIP.AUTO: NEGATIVE MG/DL
LEUKOCYTE ESTERASE UR QL STRIP.AUTO: NEGATIVE
NITRITE UR QL STRIP.AUTO: NEGATIVE
PH UR STRIP: 7 [PH] (ref 5–8)
PH, VAGINAL FLUID: 5 (ref 5–6.1)
PROT UR STRIP-MCNC: NEGATIVE MG/DL
RBC #/AREA URNS HPF: NORMAL /HPF (ref 0–5)
SP GR UR REFRACTOMETRY: 1.01 (ref 1–1.03)
UA: UC IF INDICATED,UAUC: NORMAL
UROBILINOGEN UR QL STRIP.AUTO: 0.2 EU/DL (ref 0.2–1)
WBC URNS QL MICRO: NORMAL /HPF (ref 0–4)

## 2018-05-24 PROCEDURE — 83986 ASSAY PH BODY FLUID NOS: CPT | Performed by: OBSTETRICS & GYNECOLOGY

## 2018-05-24 PROCEDURE — 36415 COLL VENOUS BLD VENIPUNCTURE: CPT | Performed by: OBSTETRICS & GYNECOLOGY

## 2018-05-24 PROCEDURE — 81001 URINALYSIS AUTO W/SCOPE: CPT | Performed by: OBSTETRICS & GYNECOLOGY

## 2018-05-24 NOTE — IP AVS SNAPSHOT
[address] [address] [address] [facility] 
[phone] Patient: Rajeev Candelario MRN: [id number] PFJ: About your hospitalization You were admitted on:  N/A You last received care in the:  [facility] 2 LABOR & DELIVERY You were discharged on:  May 25, 2018 Why you were hospitalized Your primary diagnosis was:  Not on File Follow-up Information None Discharge Orders None A check malorie indicates which time of day the medication should be taken. My Medications ASK your doctor about these medications Instructions Each Dose to Equal  
 Morning Noon Evening Bedtime PNV38-Iron Cbn&Gluc-FA-DSS-DHA 35-1- mg Cmpk Your last dose was: Your next dose is: Take 1 Tab by mouth. 1 Tab Discharge Instructions Weeks 34 to 36 of Your Pregnancy: Care Instructions Your Care Instructions By now, your baby and your belly have grown quite large. It is almost time to give birth. A full-term pregnancy can deliver between 37 and 42 weeks. Your baby's lungs are almost ready to breathe air. The bones in your baby's head are now firm enough to protect it, but soft enough to move down through the birth canal. 
You may feel excited, happy, anxious, or scared. You may wonder how you will know if you are in labor or what to expect during labor. Try to be flexible in your expectations of the birth. Because each birth is different, there is no way to know exactly what childbirth will be like for you. This care sheet will help you know what to expect and how to prepare. This may make your childbirth easier. If you haven't already had the Tdap shot during this pregnancy, talk to your doctor about getting it. It will help protect your  against pertussis infection. In the 36th week, most women have a test for group B streptococcus (GBS). GBS is a common bacteria that can live in the vagina and rectum. It can make your baby sick after birth. If you test positive, you will get antibiotics during labor. The medicine will keep your baby from getting the bacteria. Follow-up care is a key part of your treatment and safety. Be sure to make and go to all appointments, and call your doctor if you are having problems. It's also a good idea to know your test results and keep a list of the medicines you take. How can you care for yourself at home? Learn about pain relief choices · Pain is different for every woman. Talk with your doctor about your feelings about pain. · You can choose from several types of pain relief. These include medicine or breathing techniques, as well as comfort measures. You can use more than one option. · If you choose to have pain medicine during labor, talk to your doctor about your options. Some medicines lower anxiety and help with some of the pain. Others make your lower body numb so that you won't feel pain. · Be sure to tell your doctor about your pain medicine choice before you start labor or very early in your labor. You may be able to change your mind as labor progresses. · Rarely, a woman is put to sleep by medicine given through a mask or an IV. Labor and delivery · The first stage of labor has three parts: early, active, and transition. ¨ Most women have early labor at home. You can stay busy or rest, eat light snacks, drink clear fluids, and start counting contractions. ¨ When talking during a contraction gets hard, you may be moving to active labor. During active labor, you should head for the hospital if you are not there already. ¨ You are in active labor when contractions come every 3 to 4 minutes and last about 60 seconds. Your cervix is opening more rapidly. ¨ If your water breaks, contractions will come faster and stronger.  
¨ During transition, your cervix is stretching, and contractions are coming more rapidly. ¨ You may want to push, but your cervix might not be ready. Your doctor will tell you when to push. · The second stage starts when your cervix is completely opened and you are ready to push. ¨ Contractions are very strong to push the baby down the birth canal. 
¨ You will feel the urge to push. You may feel like you need to have a bowel movement. ¨ You may be coached to push with contractions. These contractions will be very strong, but you will not have them as often. You can get a little rest between contractions. ¨ You may be emotional and irritable. You may not be aware of what is going on around you. ¨ One last push, and your baby is born. · The third stage is when a few more contractions push out the placenta. This may take 30 minutes or less. · The fourth stage is the welcome recovery. You may feel overwhelmed with emotions and exhausted but alert. This is a good time to start breastfeeding. Where can you learn more? Go to http://burke-armando.info/. Enter K123 in the search box to learn more about \"Weeks 34 to 36 of Your Pregnancy: Care Instructions. \" Current as of: 2017 Content Version: 11.4 © 6243-8848 [employer]. Care instructions adapted under license by [employer] (which disclaims liability or warranty for this information). If you have questions about a medical condition or this instruction, always ask your healthcare professional. [address] [address] any warranty or liability for your use of this information. Pregnancy Precautions: Care Instructions Your Care Instructions There is no sure way to prevent labor before your due date ( labor) or to prevent most other pregnancy problems. But there are things you can do to increase your chances of a healthy pregnancy. Go to your appointments, follow your doctor's advice, and take good care of yourself. Eat well, and exercise (if your doctor agrees). And make sure to drink plenty of water. Follow-up care is a key part of your treatment and safety. Be sure to make and go to all appointments, and call your doctor if you are having problems. It's also a good idea to know your test results and keep a list of the medicines you take. How can you care for yourself at home? · Make sure you go to your prenatal appointments. At each visit, your doctor will check your blood pressure. Your doctor will also check to see if you have protein in your urine. High blood pressure and protein in urine are signs of preeclampsia. This condition can be dangerous for you and your baby. · Drink plenty of fluids, enough so that your urine is light yellow or clear like water. Dehydration can cause contractions. If you have kidney, heart, or liver disease and have to limit fluids, talk with your doctor before you increase the amount of fluids you drink. · Tell your doctor right away if you notice any symptoms of an infection, such as: ¨ Burning when you urinate. ¨ A foul-smelling discharge from your vagina. ¨ Vaginal itching. ¨ Unexplained fever. ¨ Unusual pain or soreness in your uterus or lower belly. · Eat a balanced diet. Include plenty of foods that are high in calcium and iron. ¨ Foods high in calcium include milk, cheese, yogurt, almonds, and broccoli. ¨ Foods high in iron include red meat, shellfish, poultry, eggs, beans, raisins, whole-grain bread, and leafy green vegetables. · Do not smoke. If you need help quitting, talk to your doctor about stop-smoking programs and medicines. These can increase your chances of quitting for good. · Do not drink alcohol or use illegal drugs. · Follow your doctor's directions about activity. Your doctor will let you know how much, if any, exercise you can do. · Ask your doctor if you can have sex. If you are at risk for early labor, your doctor may ask you to not have sex. · Take care to prevent falls. During pregnancy, your joints are loose, and your balance is off. Sports such as bicycling, skiing, or in-line skating can increase your risk of falling. And don't ride horses or motorcycles, dive, water ski, scuba dive, or parachute jump while you are pregnant. · Avoid getting very hot. Do not use saunas or hot tubs. Avoid staying out in the sun in hot weather for long periods. Take acetaminophen (Tylenol) to lower a high fever. · Do not take any over-the-counter or herbal medicines or supplements without talking to your doctor or pharmacist first. 
When should you call for help? Call 911 anytime you think you may need emergency care. For example, call if: 
? · You passed out (lost consciousness). ? · You have severe vaginal bleeding. ? · You have severe pain in your belly or pelvis. ? · You have had fluid gushing or leaking from your vagina and you know or think the umbilical cord is bulging into your vagina. If this happens, immediately get down on your knees so your rear end (buttocks) is higher than your head. This will decrease the pressure on the cord until help arrives. ?Call your doctor now or seek immediate medical care if: 
? · You have signs of preeclampsia, such as: 
¨ Sudden swelling of your face, hands, or feet. ¨ New vision problems (such as dimness or blurring). ¨ A severe headache. ? · You have any vaginal bleeding. ? · You have belly pain or cramping. ? · You have a fever. ? · You have had regular contractions (with or without pain) for an hour. This means that you have 8 or more within 1 hour or 4 or more in 20 minutes after you change your position and drink fluids. ? · You have a sudden release of fluid from your vagina. ? · You have low back pain or pelvic pressure that does not go away.   
? · You notice that your baby has stopped moving or is moving much less than normal.  
 ?Watch closely for changes in your health, and be sure to contact your doctor if you have any problems. Where can you learn more? Go to http://burke-armando.info/. Enter [id number] in the search box to learn more about \"Pregnancy Precautions: Care Instructions. \" Current as of: March 16, 2017 Content Version: 11.4 © 9453-5332 [employer]. Care instructions adapted under license by [email or web address] (which disclaims liability or warranty for this information). If you have questions about a medical condition or this instruction, always ask your healthcare professional. [address] any warranty or liability for your use of this information. Counting Your Baby's Kicks: Care Instructions Your Care Instructions Counting your baby's kicks is one way your doctor can tell that your baby is healthy. Most women-especially in a first pregnancy-feel their baby move for the first time between 16 and 22 weeks. The movement may feel like flutters rather than kicks. Your baby may move more at certain times of the day. When you are active, you may notice less kicking than when you are resting. At your prenatal visits, your doctor will ask whether the baby is active. In your last trimester, your doctor may ask you to count the number of times you feel your baby move. Follow-up care is a key part of your treatment and safety. Be sure to make and go to all appointments, and call your doctor if you are having problems. It's also a good idea to know your test results and keep a list of the medicines you take. How do you count fetal kicks? · A common method of checking your baby's movement is to count the number of kicks or moves you feel in 1 hour. Ten movements (such as kicks, flutters, or rolls) in 1 hour are normal. Some doctors suggest that you count in the morning until you get to 10 movements. Then you can quit for that day and start again the next day. · Pick your baby's most active time of day to count. This may be any time from morning to evening. · If you do not feel 10 movements in an hour, your baby may be sleeping. Wait for the next hour and count again. When should you call for help? Call your doctor now or seek immediate medical care if: 
? · You noticed that your baby has stopped moving or is moving much less than normal. ? Watch closely for changes in your health, and be sure to contact your doctor if you have any problems. Where can you learn more? Go to http://burke-armando.info/. Enter S854 in the search box to learn more about \"Counting Your Baby's Kicks: Care Instructions. \" Current as of: March 16, 2017 Content Version: 11.4 © 1945-9348 [employer]. Care instructions adapted under license by [employer] (which disclaims liability or warranty for this information). If you have questions about a medical condition or this instruction, always ask your healthcare professional. [place] [address] any warranty or liability for your use of this information. Introducing [facility]! [employer] introduces [employer] Services patient portal. Now you can access parts of your medical record, email your doctor's office, and request medication refills online. 1. In your internet browser, go to [email or web address] 2. Click on the First Time User? Click Here link in the Sign In box. You will see the New Member Sign Up page. 3. Enter your Prizm [employer] Services Access Code exactly as it appears below. You will not need to use this code after youve completed the sign-up process. If you do not sign up before the expiration date, you must request a new code. · [employer] Access Code: [email or web address] Expires: 7/10/2018  1:00 AM 
 
4. Enter the last four digits of your Social Security Number (xxxx) and Date of Birth (mm/dd/yyyy) as indicated and click Submit.  You will be taken to the next sign-up page. 5. Create a Medcurrentt ID. This will be your rumr login ID and cannot be changed, so think of one that is secure and easy to remember. 6. Create a Medcurrentt password. You can change your password at any time. 7. Enter your Password Reset Question and Answer. This can be used at a later time if you forget your password. 8. Enter your e-mail address. You will receive e-mail notification when new information is available in [address] 9. Click Sign Up. You can now view and download portions of your medical record. 10. Click the Download Summary menu link to download a portable copy of your medical information. If you have questions, please visit the Frequently Asked Questions section of the rumr website. Remember, rumr is NOT to be used for urgent needs. For medical emergencies, dial 911. Now available from your iPhone and Android! Introducing Filemon Anderson As a [facility] patient, I wanted to make you aware of our electronic visit tool called Filemon Yenrobbyojana. [facility] 24/7 allows you to connect within minutes with a medical provider 24 hours a day, seven days a week via a mobile device or tablet or logging into a secure website from your computer. You can access Filemon Anderson from anywhere in the [place]. A virtual visit might be right for you when you have a simple condition and feel like you just dont want to get out of bed, or cant get away from work for an appointment, when your regular [facility] provider is not available (evenings, weekends or holidays), or when youre out of town and need minor care. Electronic visits cost only $49 and if the [facility] 24/7 provider determines a prescription is needed to treat your condition, one can be electronically transmitted to a nearby pharmacy*. Please take a moment to enroll today if you have not already done so.   The enrollment process is free and takes just a few minutes. To enroll, please download the [employer] 24/7 kandi to your tablet or phone, or visit [email or web address] to enroll on your computer. And, as an [address] [place] [address] patient with a [employer] account, the results of your visits will be scanned into your electronic medical record and your primary care provider will be able to view the scanned results. We urge you to continue to see your regular [facility] provider for your ongoing medical care. And while your primary care provider may not be the one available when you seek a [facility] virtual visit, the peace of mind you get from getting a real diagnosis real time can be priceless. For more information on [facility], view our Frequently Asked Questions (FAQs) at [email or web address]. Sincerely, 
 
Shayy Schrader MD 
Chief Medical Officer Angie Leah Arnold *:  certain medications cannot be prescribed via [facility] Providers Seen During Your Hospitalization Provider Specialty Primary office phone Anil Urbano MD Obstetrics & Gynecology [phone] Your Primary Care Physician (PCP) Primary Care Physician Office Phone Office Fax Jessica Ho [phone] You are allergic to the following Allergen Reactions Morphine Nausea and Vomiting Recent Documentation Height Weight Breastfeeding? BMI OB Status Smoking Status 1.702 m 86.2 kg No 29.76 kg/m2 Pregnant Never Smoker Emergency Contacts Name Discharge Info Relation Home Work Mobile Nimesh Hensley DISCHARGE CAREGIVER [3] Spouse [3] [phone] Patient Belongings The following personal items are in your possession at time of discharge: 
  Dental Appliances: None  Visual Aid: Contacts, Glasses      Home Medications: None   Jewelry: Ring, With patient  Clothing:  At bedside Other Valuables: None  Personal Items Sent to Safe: none Please provide this summary of care documentation to your next provider. Signatures-by signing, you are acknowledging that this After Visit Summary has been reviewed with you and you have received a copy. Patient Signature:  ____________________________________________________________ Date:  ____________________________________________________________  
  
Larri Hazard Provider Signature:  ____________________________________________________________ Date:  ____________________________________________________________

## 2018-05-24 NOTE — IP AVS SNAPSHOT
Summary of Care Report The Summary of Care report has been created to help improve care coordination. Users with access to [employer] or [address] (Web-based application) may access additional patient information including the Discharge Summary. If you are not currently a [address] user and need more information, please call the number listed below in the [place] [address] section and ask to be connected with Medical Records. Facility Information Name Address Phone [address] [place] [address] [phone] Patient Information Patient Name Sex IRINEO Diaz ([id number]) Female 1990 Discharge Information Admitting Provider Service Area Unit Enma Bruno MD / [id number] 2 Labor & Delivery / [phone] Discharge Provider Discharge Date/Time Discharge Disposition Destination (none) (none) (none) (none) Patient Language Language ENGLISH [13] Hospital Problems as of 2018  Reviewed: 2014 11:23 AM by Liam Sinclair None Non-Hospital Problems as of 2018  Reviewed: 2014 11:23 AM by Liam Sinclair Class Noted - Resolved Last Modified Active Problems Irregular contractions  2018 - Present 2018 by Martina Larose MD  
  Entered by Martina Larose MD  
  
You are allergic to the following Allergen Reactions Morphine Nausea and Vomiting Current Discharge Medication List  
  
ASK your doctor about these medications Dose & Instructions Dispensing Information Comments PNV38-Iron Cbn&Gluc-FA-DSS-DHA 35-1- mg Cmpk Dose:  1 Tab Take 1 Tab by mouth. Refills:  0 Follow-up Information None Discharge Instructions Weeks 34 to 36 of Your Pregnancy: Care Instructions Your Care Instructions By now, your baby and your belly have grown quite large. It is almost time to give birth. A full-term pregnancy can deliver between 37 and 42 weeks. Your baby's lungs are almost ready to breathe air. The bones in your baby's head are now firm enough to protect it, but soft enough to move down through the birth canal. 
You may feel excited, happy, anxious, or scared. You may wonder how you will know if you are in labor or what to expect during labor. Try to be flexible in your expectations of the birth. Because each birth is different, there is no way to know exactly what childbirth will be like for you. This care sheet will help you know what to expect and how to prepare. This may make your childbirth easier. If you haven't already had the Tdap shot during this pregnancy, talk to your doctor about getting it. It will help protect your  against pertussis infection. In the 36th week, most women have a test for group B streptococcus (GBS). GBS is a common bacteria that can live in the vagina and rectum. It can make your baby sick after birth. If you test positive, you will get antibiotics during labor. The medicine will keep your baby from getting the bacteria. Follow-up care is a key part of your treatment and safety. Be sure to make and go to all appointments, and call your doctor if you are having problems. It's also a good idea to know your test results and keep a list of the medicines you take. How can you care for yourself at home? Learn about pain relief choices · Pain is different for every woman. Talk with your doctor about your feelings about pain. · You can choose from several types of pain relief. These include medicine or breathing techniques, as well as comfort measures. You can use more than one option.  
· If you choose to have pain medicine during labor, talk to your doctor about your options. Some medicines lower anxiety and help with some of the pain. Others make your lower body numb so that you won't feel pain. · Be sure to tell your doctor about your pain medicine choice before you start labor or very early in your labor. You may be able to change your mind as labor progresses. · Rarely, a woman is put to sleep by medicine given through a mask or an IV. Labor and delivery · The first stage of labor has three parts: early, active, and transition. ¨ Most women have early labor at home. You can stay busy or rest, eat light snacks, drink clear fluids, and start counting contractions. ¨ When talking during a contraction gets hard, you may be moving to active labor. During active labor, you should head for the hospital if you are not there already. ¨ You are in active labor when contractions come every 3 to 4 minutes and last about 60 seconds. Your cervix is opening more rapidly. ¨ If your water breaks, contractions will come faster and stronger. ¨ During transition, your cervix is stretching, and contractions are coming more rapidly. ¨ You may want to push, but your cervix might not be ready. Your doctor will tell you when to push. · The second stage starts when your cervix is completely opened and you are ready to push. ¨ Contractions are very strong to push the baby down the birth canal. 
¨ You will feel the urge to push. You may feel like you need to have a bowel movement. ¨ You may be coached to push with contractions. These contractions will be very strong, but you will not have them as often. You can get a little rest between contractions. ¨ You may be emotional and irritable. You may not be aware of what is going on around you. ¨ One last push, and your baby is born. · The third stage is when a few more contractions push out the placenta. This may take 30 minutes or less. · The fourth stage is the welcome recovery.  You may feel overwhelmed with emotions and exhausted but alert. This is a good time to start breastfeeding. Where can you learn more? Go to http://burke-armando.info/. Enter L644 in the search box to learn more about \"Weeks 34 to 36 of Your Pregnancy: Care Instructions. \" Current as of: 2017 Content Version: 11.4 © 8447-1917 [employer]. Care instructions adapted under license by [employer] (which disclaims liability or warranty for this information). If you have questions about a medical condition or this instruction, always ask your healthcare professional. [place] [address] any warranty or liability for your use of this information. Pregnancy Precautions: Care Instructions Your Care Instructions There is no sure way to prevent labor before your due date ( labor) or to prevent most other pregnancy problems. But there are things you can do to increase your chances of a healthy pregnancy. Go to your appointments, follow your doctor's advice, and take good care of yourself. Eat well, and exercise (if your doctor agrees). And make sure to drink plenty of water. Follow-up care is a key part of your treatment and safety. Be sure to make and go to all appointments, and call your doctor if you are having problems. It's also a good idea to know your test results and keep a list of the medicines you take. How can you care for yourself at home? · Make sure you go to your prenatal appointments. At each visit, your doctor will check your blood pressure. Your doctor will also check to see if you have protein in your urine. High blood pressure and protein in urine are signs of preeclampsia. This condition can be dangerous for you and your baby. · Drink plenty of fluids, enough so that your urine is light yellow or clear like water. Dehydration can cause contractions.  If you have kidney, heart, or liver disease and have to limit fluids, talk with your doctor before you increase the amount of fluids you drink. · Tell your doctor right away if you notice any symptoms of an infection, such as: ¨ Burning when you urinate. ¨ A foul-smelling discharge from your vagina. ¨ Vaginal itching. ¨ Unexplained fever. ¨ Unusual pain or soreness in your uterus or lower belly. · Eat a balanced diet. Include plenty of foods that are high in calcium and iron. ¨ Foods high in calcium include milk, cheese, yogurt, almonds, and broccoli. ¨ Foods high in iron include red meat, shellfish, poultry, eggs, beans, raisins, whole-grain bread, and leafy green vegetables. · Do not smoke. If you need help quitting, talk to your doctor about stop-smoking programs and medicines. These can increase your chances of quitting for good. · Do not drink alcohol or use illegal drugs. · Follow your doctor's directions about activity. Your doctor will let you know how much, if any, exercise you can do. · Ask your doctor if you can have sex. If you are at risk for early labor, your doctor may ask you to not have sex. · Take care to prevent falls. During pregnancy, your joints are loose, and your balance is off. Sports such as bicycling, skiing, or in-line skating can increase your risk of falling. And don't ride horses or motorcycles, dive, water ski, scuba dive, or parachute jump while you are pregnant. · Avoid getting very hot. Do not use saunas or hot tubs. Avoid staying out in the sun in hot weather for long periods. Take acetaminophen (Tylenol) to lower a high fever. · Do not take any over-the-counter or herbal medicines or supplements without talking to your doctor or pharmacist first. 
When should you call for help? Call 911 anytime you think you may need emergency care. For example, call if: 
? · You passed out (lost consciousness). ? · You have severe vaginal bleeding. ? · You have severe pain in your belly or pelvis. ? · You have had fluid gushing or leaking from your vagina and you know or think the umbilical cord is bulging into your vagina. If this happens, immediately get down on your knees so your rear end (buttocks) is higher than your head. This will decrease the pressure on the cord until help arrives. ?Call your doctor now or seek immediate medical care if: 
? · You have signs of preeclampsia, such as: 
¨ Sudden swelling of your face, hands, or feet. ¨ New vision problems (such as dimness or blurring). ¨ A severe headache. ? · You have any vaginal bleeding. ? · You have belly pain or cramping. ? · You have a fever. ? · You have had regular contractions (with or without pain) for an hour. This means that you have 8 or more within 1 hour or 4 or more in 20 minutes after you change your position and drink fluids. ? · You have a sudden release of fluid from your vagina. ? · You have low back pain or pelvic pressure that does not go away. ? · You notice that your baby has stopped moving or is moving much less than normal. ? Watch closely for changes in your health, and be sure to contact your doctor if you have any problems. Where can you learn more? Go to http://burke-armando.info/. Enter [id number] in the search box to learn more about \"Pregnancy Precautions: Care Instructions. \" Current as of: March 16, 2017 Content Version: 11.4 © 0791-8471 [employer]. Care instructions adapted under license by [employer] (which disclaims liability or warranty for this information). If you have questions about a medical condition or this instruction, always ask your healthcare professional. [address] any warranty or liability for your use of this information. Counting Your Baby's Kicks: Care Instructions Your Care Instructions Counting your baby's kicks is one way your doctor can tell that your baby is healthy. Most women-especially in a first pregnancy-feel their baby move for the first time between 16 and 22 weeks. The movement may feel like flutters rather than kicks. Your baby may move more at certain times of the day. When you are active, you may notice less kicking than when you are resting. At your prenatal visits, your doctor will ask whether the baby is active. In your last trimester, your doctor may ask you to count the number of times you feel your baby move. Follow-up care is a key part of your treatment and safety. Be sure to make and go to all appointments, and call your doctor if you are having problems. It's also a good idea to know your test results and keep a list of the medicines you take. How do you count fetal kicks? · A common method of checking your baby's movement is to count the number of kicks or moves you feel in 1 hour. Ten movements (such as kicks, flutters, or rolls) in 1 hour are normal. Some doctors suggest that you count in the morning until you get to 10 movements. Then you can quit for that day and start again the next day. · Pick your baby's most active time of day to count. This may be any time from morning to evening. · If you do not feel 10 movements in an hour, your baby may be sleeping. Wait for the next hour and count again. When should you call for help? Call your doctor now or seek immediate medical care if: 
? · You noticed that your baby has stopped moving or is moving much less than normal. ? Watch closely for changes in your health, and be sure to contact your doctor if you have any problems. Where can you learn more? Go to http://burke-armando.info/. Enter Q157 in the search box to learn more about \"Counting Your Baby's Kicks: Care Instructions. \" Current as of: March 16, 2017 Content Version: 11.4 © 9927-9729 [employer].  Care instructions adapted under license by Erinn Harris (which disclaims liability or warranty for this information). If you have questions about a medical condition or this instruction, always ask your healthcare professional. [place] [address] any warranty or liability for your use of this information. Chart Review Routing History Recipient Method Report Sent By Stephan Rodney MD  
Fax: [phone] Phone: [phone] [facility] DARRICK MELVIN NOTES AUTO ROUTING REPORT Kyle Lux MD [[id number]] 4/11/2018  1:00 AM 04/11/2018

## 2018-05-25 VITALS
RESPIRATION RATE: 15 BRPM | WEIGHT: 190 LBS | DIASTOLIC BLOOD PRESSURE: 65 MMHG | HEART RATE: 60 BPM | SYSTOLIC BLOOD PRESSURE: 114 MMHG | HEIGHT: 67 IN | OXYGEN SATURATION: 92 % | BODY MASS INDEX: 29.82 KG/M2 | TEMPERATURE: 98.3 F

## 2018-05-25 PROCEDURE — 99285 EMERGENCY DEPT VISIT HI MDM: CPT

## 2018-05-25 PROCEDURE — 59025 FETAL NON-STRESS TEST: CPT

## 2018-05-25 NOTE — H&P
Pt is a 34 y. o.female. [id number] The patient presents with complaint of uterine contractions and possibble LOF. The patient denies  vaginal bleeding, N/V/F/C. Pt reports good fetal movement. Pregnancy uncomplicated. Past Medical History:   Diagnosis Date    HX OTHER MEDICAL     jaw surgery; bone graft in jaw       Visit Vitals    /65    Pulse 60    Temp 98.3 °F (36.8 °C)    Resp 15    Ht 5' 7\" (1.702 m)    Wt 86.2 kg (190 lb)    SpO2 92%    Breastfeeding No    BMI 29.76 kg/m2       No data found. Cervical Exam  Dilation (cm): 2  Eff: 50 %  Station: -2  Vaginal exam done by? : Ruth Triana RN  Membrane Status: Intact      ASSESSMENT:      IUP @ 36 weeks 3 days  Contractions      PLAN:    Nitrazine Neg No evidence of leakage.  Likely mucus  Observation  Discharge home Labor not ongoing

## 2018-05-25 NOTE — DISCHARGE INSTRUCTIONS
Weeks 34 to 36 of Your Pregnancy: Care Instructions  Your Care Instructions    By now, your baby and your belly have grown quite large. It is almost time to give birth. A full-term pregnancy can deliver between 37 and 42 weeks. Your baby's lungs are almost ready to breathe air. The bones in your baby's head are now firm enough to protect it, but soft enough to move down through the birth canal.  You may feel excited, happy, anxious, or scared. You may wonder how you will know if you are in labor or what to expect during labor. Try to be flexible in your expectations of the birth. Because each birth is different, there is no way to know exactly what childbirth will be like for you. This care sheet will help you know what to expect and how to prepare. This may make your childbirth easier. If you haven't already had the Tdap shot during this pregnancy, talk to your doctor about getting it. It will help protect your  against pertussis infection. In the 36th week, most women have a test for group B streptococcus (GBS). GBS is a common bacteria that can live in the vagina and rectum. It can make your baby sick after birth. If you test positive, you will get antibiotics during labor. The medicine will keep your baby from getting the bacteria. Follow-up care is a key part of your treatment and safety. Be sure to make and go to all appointments, and call your doctor if you are having problems. It's also a good idea to know your test results and keep a list of the medicines you take. How can you care for yourself at home? Learn about pain relief choices  · Pain is different for every woman. Talk with your doctor about your feelings about pain. · You can choose from several types of pain relief. These include medicine or breathing techniques, as well as comfort measures. You can use more than one option. · If you choose to have pain medicine during labor, talk to your doctor about your options.  Some medicines lower anxiety and help with some of the pain. Others make your lower body numb so that you won't feel pain. · Be sure to tell your doctor about your pain medicine choice before you start labor or very early in your labor. You may be able to change your mind as labor progresses. · Rarely, a woman is put to sleep by medicine given through a mask or an IV. Labor and delivery  · The first stage of labor has three parts: early, active, and transition. ¨ Most women have early labor at home. You can stay busy or rest, eat light snacks, drink clear fluids, and start counting contractions. ¨ When talking during a contraction gets hard, you may be moving to active labor. During active labor, you should head for the hospital if you are not there already. ¨ You are in active labor when contractions come every 3 to 4 minutes and last about 60 seconds. Your cervix is opening more rapidly. ¨ If your water breaks, contractions will come faster and stronger. ¨ During transition, your cervix is stretching, and contractions are coming more rapidly. ¨ You may want to push, but your cervix might not be ready. Your doctor will tell you when to push. · The second stage starts when your cervix is completely opened and you are ready to push. ¨ Contractions are very strong to push the baby down the birth canal.  ¨ You will feel the urge to push. You may feel like you need to have a bowel movement. ¨ You may be coached to push with contractions. These contractions will be very strong, but you will not have them as often. You can get a little rest between contractions. ¨ You may be emotional and irritable. You may not be aware of what is going on around you. ¨ One last push, and your baby is born. · The third stage is when a few more contractions push out the placenta. This may take 30 minutes or less. · The fourth stage is the welcome recovery. You may feel overwhelmed with emotions and exhausted but alert.  This is a good time to start breastfeeding. Where can you learn more? Go to http://burke-armando.info/. Enter C535 in the search box to learn more about \"Weeks 34 to 36 of Your Pregnancy: Care Instructions. \"  Current as of: 2017  Content Version: 11.4  © 5589-9697 [employer]. Care instructions adapted under license by [employer] (which disclaims liability or warranty for this information). If you have questions about a medical condition or this instruction, always ask your healthcare professional. [address] any warranty or liability for your use of this information. Pregnancy Precautions: Care Instructions  Your Care Instructions    There is no sure way to prevent labor before your due date ( labor) or to prevent most other pregnancy problems. But there are things you can do to increase your chances of a healthy pregnancy. Go to your appointments, follow your doctor's advice, and take good care of yourself. Eat well, and exercise (if your doctor agrees). And make sure to drink plenty of water. Follow-up care is a key part of your treatment and safety. Be sure to make and go to all appointments, and call your doctor if you are having problems. It's also a good idea to know your test results and keep a list of the medicines you take. How can you care for yourself at home? · Make sure you go to your prenatal appointments. At each visit, your doctor will check your blood pressure. Your doctor will also check to see if you have protein in your urine. High blood pressure and protein in urine are signs of preeclampsia. This condition can be dangerous for you and your baby. · Drink plenty of fluids, enough so that your urine is light yellow or clear like water. Dehydration can cause contractions.  If you have kidney, heart, or liver disease and have to limit fluids, talk with your doctor before you increase the amount of fluids you drink.  · Tell your doctor right away if you notice any symptoms of an infection, such as:  ¨ Burning when you urinate. ¨ A foul-smelling discharge from your vagina. ¨ Vaginal itching. ¨ Unexplained fever. ¨ Unusual pain or soreness in your uterus or lower belly. · Eat a balanced diet. Include plenty of foods that are high in calcium and iron. ¨ Foods high in calcium include milk, cheese, yogurt, almonds, and broccoli. ¨ Foods high in iron include red meat, shellfish, poultry, eggs, beans, raisins, whole-grain bread, and leafy green vegetables. · Do not smoke. If you need help quitting, talk to your doctor about stop-smoking programs and medicines. These can increase your chances of quitting for good. · Do not drink alcohol or use illegal drugs. · Follow your doctor's directions about activity. Your doctor will let you know how much, if any, exercise you can do. · Ask your doctor if you can have sex. If you are at risk for early labor, your doctor may ask you to not have sex. · Take care to prevent falls. During pregnancy, your joints are loose, and your balance is off. Sports such as bicycling, skiing, or in-line skating can increase your risk of falling. And don't ride horses or motorcycles, dive, water ski, scuba dive, or parachute jump while you are pregnant. · Avoid getting very hot. Do not use saunas or hot tubs. Avoid staying out in the sun in hot weather for long periods. Take acetaminophen (Tylenol) to lower a high fever. · Do not take any over-the-counter or herbal medicines or supplements without talking to your doctor or pharmacist first.  When should you call for help? Call 911 anytime you think you may need emergency care. For example, call if:  ? · You passed out (lost consciousness). ? · You have severe vaginal bleeding. ? · You have severe pain in your belly or pelvis.    ? · You have had fluid gushing or leaking from your vagina and you know or think the umbilical cord is bulging into your vagina. If this happens, immediately get down on your knees so your rear end (buttocks) is higher than your head. This will decrease the pressure on the cord until help arrives. ?Call your doctor now or seek immediate medical care if:  ? · You have signs of preeclampsia, such as:  ¨ Sudden swelling of your face, hands, or feet. ¨ New vision problems (such as dimness or blurring). ¨ A severe headache. ? · You have any vaginal bleeding. ? · You have belly pain or cramping. ? · You have a fever. ? · You have had regular contractions (with or without pain) for an hour. This means that you have 8 or more within 1 hour or 4 or more in 20 minutes after you change your position and drink fluids. ? · You have a sudden release of fluid from your vagina. ? · You have low back pain or pelvic pressure that does not go away. ? · You notice that your baby has stopped moving or is moving much less than normal.   ? Watch closely for changes in your health, and be sure to contact your doctor if you have any problems. Where can you learn more? Go to http://burke-aramndo.info/. Enter [id number] in the search box to learn more about \"Pregnancy Precautions: Care Instructions. \"  Current as of: March 16, 2017  Content Version: 11.4  © 5150-2394 [employer]. Care instructions adapted under license by [employer] (which disclaims liability or warranty for this information). If you have questions about a medical condition or this instruction, always ask your healthcare professional. [place] [address] any warranty or liability for your use of this information. Counting Your Baby's Kicks: Care Instructions  Your Care Instructions    Counting your baby's kicks is one way your doctor can tell that your baby is healthy. Most women-especially in a first pregnancy-feel their baby move for the first time between 16 and 22 weeks.  The movement may feel like flutters rather than kicks. Your baby may move more at certain times of the day. When you are active, you may notice less kicking than when you are resting. At your prenatal visits, your doctor will ask whether the baby is active. In your last trimester, your doctor may ask you to count the number of times you feel your baby move. Follow-up care is a key part of your treatment and safety. Be sure to make and go to all appointments, and call your doctor if you are having problems. It's also a good idea to know your test results and keep a list of the medicines you take. How do you count fetal kicks? · A common method of checking your baby's movement is to count the number of kicks or moves you feel in 1 hour. Ten movements (such as kicks, flutters, or rolls) in 1 hour are normal. Some doctors suggest that you count in the morning until you get to 10 movements. Then you can quit for that day and start again the next day. · Pick your baby's most active time of day to count. This may be any time from morning to evening. · If you do not feel 10 movements in an hour, your baby may be sleeping. Wait for the next hour and count again. When should you call for help? Call your doctor now or seek immediate medical care if:  ? · You noticed that your baby has stopped moving or is moving much less than normal.   ? Watch closely for changes in your health, and be sure to contact your doctor if you have any problems. Where can you learn more? Go to http://burke-armando.info/. Enter P787 in the search box to learn more about \"Counting Your Baby's Kicks: Care Instructions. \"  Current as of: March 16, 2017  Content Version: 11.4  © 8730-5117 [employer]. Care instructions adapted under license by [employer] (which disclaims liability or warranty for this information).  If you have questions about a medical condition or this instruction, always ask your healthcare professional. [employer] disclaims any warranty or liability for your use of this information.

## 2018-05-25 NOTE — PROGRESS NOTES
05/24/18  8:55 PM  Patient admitted to triage room 03 for a complaint of contractions and leaking of fluid. Patient states that she has been philippe for weeks and the leaking of fluid was this afternoon. Patient placed on the monitor. Assumed care at this time. 9:04 PM  Dr. Fan Arias in to see the patient, review the tracing, do the nitrazine and discuss the plan of care. 10:18 PM  Patient ambulating to the restroom. 10:20 PM  Spoke to Dr. Fan Arias about the patient. I informed her that the patient really did not want an IV for hydration, that she would rather oral hydrate. She stated to continue to watch the patient and then recheck her cervix. If she has not changed, she may go home. 11:32 PM  Spoke to Dr. Fan Arias and updated her on the patients cervical exam change and informed her that the patient would like to walk. She stated that the patient may walk if her strip is good and to continue watching her and check her again. 11:42 PM  Patient taken off of the monitor to walk and transferred to [facility]. Patient ambulating at this time. Informed her that I want to recheck her and listen to heart tones at 0100.  05/25/18  12:45 AM  Patient continues to walk in the greer. 1:05 AM  Patient back in the room for monitoring and a cervical exam.  2:00 AM  Spoke to Dr. Fan Arias and updated her on the patients cervical exam and tracing. She stated that the patient may go home or I can offer for her to stay at the hospital and wait to be seen in the AM by her own practitioner but to remind the patient that we will not be doing anything to augment her labor or induce her at this point. 2:30 AM  Discussed with the patient the option of going home or staying inpatient and told her if she stays we will be doing nothing to assist with her labor. She is choosing to go home and call her provider in the morning vs. Waiting until next Wednesday for her scheduled appointment.   2:44 AM  Discharge instructions reviewed with the patient and signed. Questions asked and answered. The patient verbalizes understanding. Labor precautions reviewed. 2:47 AM  Patient discharged in stable condition, ambulatory, accompanied by her .

## 2018-06-05 ENCOUNTER — HOSPITAL ENCOUNTER (INPATIENT)
Age: 28
LOS: 3 days | Discharge: HOME | End: 2018-06-08
Attending: OBSTETRICS & GYNECOLOGY | Admitting: OBSTETRICS & GYNECOLOGY
Payer: COMMERCIAL

## 2018-06-05 LAB
BASOPHILS # BLD: 0 K/UL (ref 0–0.1)
BASOPHILS NFR BLD: 0 % (ref 0–1)
DIFFERENTIAL METHOD BLD: ABNORMAL
EOSINOPHIL # BLD: 0 K/UL (ref 0–0.4)
EOSINOPHIL NFR BLD: 0 % (ref 0–7)
ERYTHROCYTE [DISTWIDTH] IN BLOOD BY AUTOMATED COUNT: 12.8 % (ref 11.5–14.5)
HCT VFR BLD AUTO: 39.6 % (ref 35–47)
HGB BLD-MCNC: 13.3 G/DL (ref 11.5–16)
IMM GRANULOCYTES # BLD: 0.1 K/UL (ref 0–0.04)
IMM GRANULOCYTES NFR BLD AUTO: 1 % (ref 0–0.5)
LYMPHOCYTES # BLD: 1.6 K/UL (ref 0.8–3.5)
LYMPHOCYTES NFR BLD: 16 % (ref 12–49)
MCH RBC QN AUTO: 33.2 PG (ref 26–34)
MCHC RBC AUTO-ENTMCNC: 33.6 G/DL (ref 30–36.5)
MCV RBC AUTO: 98.8 FL (ref 80–99)
MONOCYTES # BLD: 0.7 K/UL (ref 0–1)
MONOCYTES NFR BLD: 8 % (ref 5–13)
NEUTS SEG # BLD: 7.1 K/UL (ref 1.8–8)
NEUTS SEG NFR BLD: 74 % (ref 32–75)
NRBC # BLD: 0 K/UL (ref 0–0.01)
NRBC BLD-RTO: 0 PER 100 WBC
PLATELET # BLD AUTO: 223 K/UL (ref 150–400)
PMV BLD AUTO: 10.5 FL (ref 8.9–12.9)
RBC # BLD AUTO: 4.01 M/UL (ref 3.8–5.2)
WBC # BLD AUTO: 9.5 K/UL (ref 3.6–11)

## 2018-06-05 PROCEDURE — 65270000029 HC RM PRIVATE

## 2018-06-05 PROCEDURE — 75410000002 HC LABOR FEE PER 1 HR: Performed by: OBSTETRICS & GYNECOLOGY

## 2018-06-05 PROCEDURE — 3E033VJ INTRODUCTION OF OTHER HORMONE INTO PERIPHERAL VEIN, PERCUTANEOUS APPROACH: ICD-10-PCS | Performed by: OBSTETRICS & GYNECOLOGY

## 2018-06-05 PROCEDURE — 77010026065 HC OXYGEN MINIMUM MEDICAL AIR: Performed by: OBSTETRICS & GYNECOLOGY

## 2018-06-05 PROCEDURE — 10907ZC DRAINAGE OF AMNIOTIC FLUID, THERAPEUTIC FROM PRODUCTS OF CONCEPTION, VIA NATURAL OR ARTIFICIAL OPENING: ICD-10-PCS | Performed by: OBSTETRICS & GYNECOLOGY

## 2018-06-05 PROCEDURE — 74011250636 HC RX REV CODE- 250/636: Performed by: OBSTETRICS & GYNECOLOGY

## 2018-06-05 PROCEDURE — 75410000000 HC DELIVERY VAGINAL/SINGLE: Performed by: OBSTETRICS & GYNECOLOGY

## 2018-06-05 PROCEDURE — 75410000003 HC RECOV DEL/VAG/CSECN EA 0.5 HR: Performed by: OBSTETRICS & GYNECOLOGY

## 2018-06-05 PROCEDURE — 0HQ9XZZ REPAIR PERINEUM SKIN, EXTERNAL APPROACH: ICD-10-PCS | Performed by: OBSTETRICS & GYNECOLOGY

## 2018-06-05 PROCEDURE — 85025 COMPLETE CBC W/AUTO DIFF WBC: CPT | Performed by: OBSTETRICS & GYNECOLOGY

## 2018-06-05 PROCEDURE — 36415 COLL VENOUS BLD VENIPUNCTURE: CPT | Performed by: OBSTETRICS & GYNECOLOGY

## 2018-06-05 RX ORDER — SODIUM CHLORIDE, SODIUM LACTATE, POTASSIUM CHLORIDE, CALCIUM CHLORIDE 600; 310; 30; 20 MG/100ML; MG/100ML; MG/100ML; MG/100ML
125 INJECTION, SOLUTION INTRAVENOUS CONTINUOUS
Status: DISCONTINUED | OUTPATIENT
Start: 2018-06-05 | End: 2018-06-06

## 2018-06-05 RX ORDER — NALOXONE HYDROCHLORIDE 0.4 MG/ML
0.4 INJECTION, SOLUTION INTRAMUSCULAR; INTRAVENOUS; SUBCUTANEOUS AS NEEDED
Status: DISCONTINUED | OUTPATIENT
Start: 2018-06-05 | End: 2018-06-06

## 2018-06-05 RX ORDER — OXYTOCIN IN 5 % DEXTROSE 30/500 ML
.5-2 PLASTIC BAG, INJECTION (ML) INTRAVENOUS
Status: DISCONTINUED | OUTPATIENT
Start: 2018-06-05 | End: 2018-06-06

## 2018-06-05 RX ORDER — SODIUM CHLORIDE 0.9 % (FLUSH) 0.9 %
5-10 SYRINGE (ML) INJECTION AS NEEDED
Status: DISCONTINUED | OUTPATIENT
Start: 2018-06-05 | End: 2018-06-06

## 2018-06-05 RX ORDER — SODIUM CHLORIDE 0.9 % (FLUSH) 0.9 %
5-10 SYRINGE (ML) INJECTION EVERY 8 HOURS
Status: DISCONTINUED | OUTPATIENT
Start: 2018-06-05 | End: 2018-06-06

## 2018-06-05 RX ADMIN — Medication 2 MILLI-UNITS/MIN: at 17:54

## 2018-06-05 RX ADMIN — SODIUM CHLORIDE, SODIUM LACTATE, POTASSIUM CHLORIDE, AND CALCIUM CHLORIDE 125 ML/HR: 600; 310; 30; 20 INJECTION, SOLUTION INTRAVENOUS at 17:45

## 2018-06-05 RX ADMIN — Medication 2 MILLI-UNITS/MIN: at 15:03

## 2018-06-05 NOTE — IP AVS SNAPSHOT
[address] [facility] 
 
 
 [address] [facility] [address] [facility] 
[phone] Patient: Alysia Mukherjee MRN: [id number] OIS:8[birth year]50[birth year]0286 A check malorie indicates which time of day the medication should be taken. My Medications START taking these medications Instructions Each Dose to Equal  
 Morning Noon Evening Bedtime  
 ibuprofen 600 mg tablet Commonly known as:  MOTRIN Your last dose was: Your next dose is: Take 1 Tab by mouth every six (6) hours as needed for Pain. 600 mg  
    
   
   
   
  
 oxyCODONE-acetaminophen 5-325 mg per tablet Commonly known as:  PERCOCET Your last dose was: Your next dose is: Take 1 Tab by mouth every six (6) hours as needed for Pain. Max Daily Amount: 4 Tabs. 1 Tab CONTINUE taking these medications Instructions Each Dose to Equal  
 Morning Noon Evening Bedtime PNV38-Iron Cbn&Gluc-FA-DSS-DHA 35-1- mg Cmpk Your last dose was: Your next dose is: Take 1 Tab by mouth. 1 Tab Where to Get Your Medications Information on where to get these meds will be given to you by the nurse or doctor. ! Ask your nurse or doctor about these medications  
  ibuprofen 600 mg tablet  
 oxyCODONE-acetaminophen 5-325 mg per tablet

## 2018-06-05 NOTE — PROGRESS NOTES
1415 received a  at 45 with from Drs office with Dx SROM per office, orders received to admit for delivery. Pt states b\she been leaking small amt clear liquid. Denies vaginal bleeding, + irregu;ar ctxs. Oriented to room, reveiwed POC and pt agrees to start pitocin as ordered. Pt has history of jaw surgery to correct underbite , appendectomy, previous vaginal delivery    184 Importance of maintaining a continueous fhr recording explained to pt. unalbe to record ctxs or keep fhr on efm while pt on side, repositioned to left tilt. Pt verbalizes understanding and it coperative    Bedside and Verbal shift change report given to Max Quint[address]. RN (oncoming nurse) by Alex Candelario RNC (offgoing nurse). Report included the following information SBAR, Kardex, MAR and Recent Results.

## 2018-06-05 NOTE — H&P
EDC:2018  EGA: 38 weeks, 0 days      Vital Signs   34Years Old Female  Weight: 196 pounds  BP:       118/74  [facility] delivery[facility]    Pap/HPV/Gardasil History   History of abnormal pap: no  Gardasil Injection History: Complete    Patient's Prenatal Care with Doctor of Record Mateo Cortez MD Notable For -    Freeman blum contractions  Urinary frequency  Rule out ruptured membranes  Screening for risk of  labor  Decreased fetal movement  Freeman blum contractions  Previous oligo pregnant , VISHNU 36wk ___  Normal pregnancy multigravida - desires low intervention   Lab Screening                  Allergies    MORPHINE (Moderate)      Medications Removed from Medication List        Flowsheet View for Follow-up Visit     Estimated weeks of        gestation:  38 0/7     Weight:  196     Blood pressure: 118 / 74     Urine Protein:  N     Urine Glucose: N     Headache:  No     Nausea/vomiting: No     Edema:  0     Vaginal bleeding: no     Vaginal discharge: leak? Fetal activity:  yes     FHR:   151     Labor symptoms: yes     Fetal position:  cephalic     Cx Dilation:  3     Cx Effacement: 70%     Cx Station:  -3     Preceptor:  cz     Comment:  Had consistant, strong contractions all day yesterday. Says she has a leakage with every contraction. Contractions are not as strong today, but still there. Exam: -pool/ +nitrazine, +ferning. Patient sent for AOL.  +FM. GBS Neg.           Impression & Recommendations:    Problem # 1:  Normal pregnancy multigravida - desires low intervention (ICD-V22.1) (OQN57-L54.92)    Orders:  Antepartum Care (CPT-10)  Patient Sent to L&D (CPT-LD)  Sent to L&D  (CPT-AdmitF)      Problem # 2:  Rule out ruptured membranes (RKO-212.99) (YJS34-T54.89)  +high leak/ ROM  Sent to L&D       Orders:  Wet Smear/KOH [employer] ([id number])  Wet Smear/KOH [employer] Ins[employer] ([id number])        Medications (at conclusion of this visit)    10/26/2017 PRENATAL VITAMINS TABLET (PRENATAL MV & MIN W/FE-FA TABS)               Past Medical History:     Reviewed history from 10/26/2017 and no changes required:        none    Past Surgical History:     Reviewed history from 10/26/2017 and no changes required:        appendectomy        jaw surgery x 3        wisdom teeth    Family History Summary:      Reviewed history Last on 05/25/2018 and no changes required:06/05/2018  No Known Family History - Entered On: 10/26/2017      Social History:     Reviewed history from 04/23/2018 and no changes required: Former NICU RN at [employer]         Pre op at [facility]       Risk Factors:     Smoked Tobacco Use:  Never smoker  Exercise:  yes      Review of Systems        See HPI    Except as noted in the HPI, the review of systems is negative for General, Breast, , CV, Resp, GI, Endo, MS, Derm, Neuro, Psych, Eyes, ENT, Allergy and Heme.       Vital Signs    Blood Pressure: 118 / 74    Weight:  196 pounds      Physical Exam     General           General appearance:  no acute distress    Abdomen           Abdomen:  gravid    Pelvic Exam           EGBUS:  no lesions          Vagina:  normal appearing without lesions          Uterus:  gravid          Adnexa:  non palpable                  Dilation: : 3                  Effacement:  70%                  Station:  -3                  Presentation:  cephalic    Allergies    MORPHINE (Moderate)      Medications Removed from Medication List        Impression & Recommendations:    Problem # 1:  Normal pregnancy multigravida - desires low intervention (ICD-V22.1) (QPF40-N44.47)    Orders:  Antepartum Care (CPT-10)  Patient Sent to L&D (CPT-LD)  Sent to L&D  (CPT-AdmitF)      Problem # 2:  Rule out ruptured membranes (OJC-785.36) (XSM87-Y12.89)  +high leak/ ROM  Sent to L&D         Medications (at conclusion of this visit)    10/26/2017 PRENATAL VITAMINS TABLET (PRENATAL MV & MIN W/FE-FA TABS)           LABORATORY DATA   TEST DATE RESULT   Group B Strep culture 05/21/2018 Negative                                   (Group B Strep Culture Result Field)   Blood Type 11/13/2017 A                                             (Blood Type Result Field)   Rh 11/13/2017 Positive                                   (Rh Result Field)   Rhogam Inj Given     Tdap Vaccine Given 03/27/2018 declined   Antibody Screen 03/27/2018 Negative   Rubella  [facility] Reference Ranges On or After 3/10/14                  <0.90              Non-immune      0.90 - 0.99     Equivocal      >0.99              Immune    [facility] Reference Ranges  Before 3/10/14           <5                 Non-immune             5 - 9               Equivocal            >9                 Immune  [facility] Reference Ranges       < Or = 0.90       Negative             0.91-1.09          Equivocal            > Or = 1.10       Positive   11/13/2017     3.84     TPA (T Pallidum Antibodies) 03/27/2018 Negative   Serology (RPR)     HBsAg 11/13/2017 Negative   HIV 11/13/2017 Non Reactive   Hemoglobin 04/17/2018 13.4   Hematocrit 04/17/2018 40.2   Platelets 10/67/8923 237 X10E3/UL   TSH     Urine Culture 05/09/2018 Negative   GC DNA Probe 11/13/2017 Negative   Chlamydia DNA 11/13/2017 Negative   PAP 11/13/2017 NIL   Flu Vaccine Given 12/14/2017 Vacc.  [address]   HGBA1C     HGB Electro     T4, Free     BG Fasting     GTT 1H 50G 03/27/2018 81   GTT 1H 100G     GTT 2H 100G     GTT 3H 100G     Glucose Plasma     CF Accept or Decline 11/13/2017 declined   CF Screen Result     Nuchal Trans 11/13/2017 declined   AFP Only 01/10/2018 Not Indicated   Tetra 01/10/2018 Declined   AFP Serum 11/13/2017 declined   CVS     AFP Amniotic     Amnio Karyo     FISH     GC Culture     Chlamydia Cult     Ureaplasma     Mycoplasma     WBC 04/17/2018 10.6 X10E3/UL   RBC 04/17/2018 4.07 X10E6/UL   MCV 04/17/2018 99   MCH 04/17/2018 32.9   MCHC RBC 04/17/2018 33.3     ULTRASOUND DATA   TEST DATE RESULT   Estimated Fetal Weight 05/21/2018 [id number]^9807 g&grams Weight % 05/21/2018 62^62% %&percent                                                VISHNU 05/21/2018 18.56^18.6 cm&centimeters                    BPP 05/21/2018 8^8 [n/a]&Not applicable   Cervical Length (mm)

## 2018-06-05 NOTE — PROGRESS NOTES
1905 report from [address]. Patient desires minimal intervention for labor, agrees to POC.    7:40 PM patient states her contractions icnreased in intensity yesterday around 1 pm and that is when she first experienced leakage of fluid with ctx. No large gush. Clear fluid. She was nitrazine positive at the office this afternoon although patient states MD told her he could still feel BOW on SVE. Baby active. Discussed S/S of infection. Verbalized understanding. 8:43 PM patient in bed resting quietly with  at bedside. No needs expressed at this time    2116 no complaints at this time. RN has offered patrick pads to patient, patient denies leakage or need for pads at this time. 10:57 PM per Dr. Broadus Moritz, pitocin to stay at 6 Mu/min for now    1:51 AM patient resting in bed breathing through contractions, states they have gotten more intense but she is coping well. Monitoring patient    6:56 AM SABR bedside report to Patric Traore RN. Care of patient released at this time.

## 2018-06-05 NOTE — PROGRESS NOTES
Patient seen for interval exam  Now with spaced out mild contractions, continued loss of scant clear fluid  No vaginal bleeding, good fetal movement  Visit Vitals    /68    Pulse 67    Temp 97.8 °F (36.6 °C)    Resp 18    SpO2 97%    Breastfeeding Yes      Patient Vitals for the past 4 hrs: Mode Fetal Heart Rate Fetal Activity Variability Decelerations Accelerations RN Reviewed Strip? Provider who reviewed strip? FHR Interventions Non Stress Test   06/05/18 1749 - - - - - - - Dr Jaya Yates, to start pitocin per Dr Jaya Yates - -   06/05/18 1658 External 130 - 6-25 BPM Variable Yes Yes - - -   06/05/18 1618 External 135 - 6-25 BPM None Yes Yes - - -   06/05/18 1550 External 135 - 6-25 BPM Variable No Yes - - Reactive   06/05/18 1548 - - - - - - - Dr Jaya Yates - -   06/05/18 1525 - - - - - - - - Provider Notified -   06/05/18 1513 External 130 - 6-25 BPM Variable Yes - - Discontinue Oxytocin;IV Fluid Bolus; Oxygen -   06/05/18 1504 External 130 Present 6-25 BPM None Yes Yes - - -   06/05/18 1435 External 130 Present 6-25 BPM None Yes Yes - - Reactive    reactive  TOCO mild contractions Q6-8 min  Cervical Exam  Dilation (cm): 3  Eff: 50 %  Station: -2  Vaginal exam done by? : Dr Familia Moralez Status: SROM (unsure)   no change noted  Will restart pitocin for augmentation and continue pending fetal tolerance

## 2018-06-05 NOTE — IP AVS SNAPSHOT
[address] 
[phone] Patient: Valerie Jones MRN: [id number] About your hospitalization You were admitted on:  2018 You last received care in the:  [facility] 3 MOTHER INFANT You were discharged on:  2018 Why you were hospitalized Your primary diagnosis was:  Not on File Your diagnoses also included:  Labor And Delivery, Indication For Care Follow-up Information Follow up With Details Comments Contact Info Dakota Chun MD In 6 weeks postpartum visit [address] 
[phone] Reza Paul MD   [address] [place] [address] 
[phone] Discharge Orders None A check malorie indicates which time of day the medication should be taken. My Medications START taking these medications Instructions Each Dose to Equal  
 Morning Noon Evening Bedtime  
 ibuprofen 600 mg tablet Commonly known as:  MOTRIN Your last dose was: Your next dose is: Take 1 Tab by mouth every six (6) hours as needed for Pain. 600 mg  
    
   
   
   
  
 oxyCODONE-acetaminophen 5-325 mg per tablet Commonly known as:  PERCOCET Your last dose was: Your next dose is: Take 1 Tab by mouth every six (6) hours as needed for Pain. Max Daily Amount: 4 Tabs. 1 Tab CONTINUE taking these medications Instructions Each Dose to Equal  
 Morning Noon Evening Bedtime PNV38-Iron Cbn&Gluc-FA-DSS-DHA 35-1- mg Cmpk Your last dose was: Your next dose is: Take 1 Tab by mouth. 1 Tab Where to Get Your Medications Information on where to get these meds will be given to you by the nurse or doctor. ! Ask your nurse or doctor about these medications  
  ibuprofen 600 mg tablet  
 oxyCODONE-acetaminophen 5-325 mg per tablet Opioid Education Prescription Opioids: What You Need to Know: 
 
 
Diet/Diet Restrictions: 
· Eight 8-ounce glasses of fluid daily (water, juices); avoid excessive caffeine intake. · Meals/snacks as desired which are high in fiber and carbohydrates and low in fat and cholesterol. Physical Activity / Restrictions / Safety: · Avoid heavy lifting, no more that 8 lbs. For 2-3 weeks;  
· Limit use of stairs to 2 times daily for the first week home. · No driving for one week. · Avoid intercourse 4-6 weeks, no douching or tampon use. · Check with obstetrician before starting or resuming an exercise program.   
 
Discharge Instructions/Special Treatment/Home Care Needs:  
 
· Continue prenatal vitamins. · Continue to use squirt bottle with warm water on your episiotomy after each bathroom use until bleeding stops. · If steri-strips applied to your incision, remove in 7-10 days. Call your doctor for the following: · Fever over 101 degrees by mouth. · Vaginal bleeding heavier than a normal menstrual period or clots larger than a golf ball. · Red streaks or increased swelling of legs, painful red streaks on your breast. 
· Painful urination, constipation and increased pain or swelling or discharge with your incision. · If you feel extremely anxious or overwhelmed. · If you have thoughts of harming yourself and/or your baby. Pain Management: · Take Acetaminophen (Tylenol) or Ibuprofen (Advil, Motrin), as directed for pain. · Use a warm Sitz bath 3 times daily to relieve episiotomy or hemorrhoidal discomfort. · For hemorrhoidal discomfort, use Tucks and Anusol cream as needed and directed. · Heating pad to  incision as needed. Follow-Up Care:  
 
Appointment with MD: Follow-up Appointments Procedures  FOLLOW UP VISIT Appointment in: 6 Weeks Standing Status:   Standing Number of Occurrences:   1 Order Specific Question:   Appointment in Answer:   6 Weeks Telephone number: [phone] Signed By: Aicha Barrientos MD                                                                                                   Date: 2018 Time: 8:15 AM 
 
  
  
  
Trumaker Announcement We are excited to announce that we are making your provider's discharge notes available to you in Trumaker. You will see these notes when they are completed and signed by the physician that discharged you from your recent hospital stay. If you have any questions or concerns about any information you see in Trumaker, please call the Health Information Department where you were seen or reach out to your Primary Care Provider for more information about your plan of care. Introducing [facility]! [employer] introduces Trumaker patient portal. Now you can access parts of your medical record, email your doctor's office, and request medication refills online. 1. In your internet browser, go to [email or web address] 2. Click on the First Time User? Click Here link in the Sign In box. You will see the New Member Sign Up page. 3. Enter your Trumaker Access Code exactly as it appears below. You will not need to use this code after youve completed the sign-up process. If you do not sign up before the expiration date, you must request a new code. · Trumaker Access Code: [email or web address] Expires: 7/10/2018  1:00 AM 
 
 4. Enter the last four digits of your Social Security Number (xxxx) and Date of Birth (mm/dd/yyyy) as indicated and click Submit. You will be taken to the next sign-up page. 5. Create a AFAR ID. This will be your AFAR login ID and cannot be changed, so think of one that is secure and easy to remember. 6. Create a AFAR password. You can change your password at any time. 7. Enter your Password Reset Question and Answer. This can be used at a later time if you forget your password. 8. Enter your e-mail address. You will receive e-mail notification when new information is available in [address] 9. Click Sign Up. You can now view and download portions of your medical record. 10. Click the Download Summary menu link to download a portable copy of your medical information. If you have questions, please visit the Frequently Asked Questions section of the AFAR website. Remember, AFAR is NOT to be used for urgent needs. For medical emergencies, dial 911. Now available from your iPhone and Android! Introducing Filemon Anderson As a [employer] patient, I wanted to make you aware of our electronic visit tool called Filemon DiehlSite Tour. [employer] 24/7 allows you to connect within minutes with a medical provider 24 hours a day, seven days a week via a mobile device or tablet or logging into a secure website from your computer. You can access Filemon Anderson from anywhere in the [place]. A virtual visit might be right for you when you have a simple condition and feel like you just dont want to get out of bed, or cant get away from work for an appointment, when your regular [employer] provider is not available (evenings, weekends or holidays), or when youre out of town and need minor care.   Electronic visits cost only $49 and if the Filemon Anderson provider determines a prescription is needed to treat your condition, one can be electronically transmitted to a nearby pharmacy*. Please take a moment to enroll today if you have not already done so. The enrollment process is free and takes just a few minutes. To enroll, please download the [employer] 24/7 kandi to your tablet or phone, or visit [email or web address] to enroll on your computer. And, as an [address] [place] [address] patient with a Known account, the results of your visits will be scanned into your electronic medical record and your primary care provider will be able to view the scanned results. We urge you to continue to see your regular [employer] provider for your ongoing medical care. And while your primary care provider may not be the one available when you seek a VeriWave virtual visit, the peace of mind you get from getting a real diagnosis real time can be priceless. For more information on VeriWave, view our Frequently Asked Questions (FAQs) at [email or web address]. Sincerely, 
 
Shayy Schrader MD 
Chief Medical Officer [address] [place] [address] *:  certain medications cannot be prescribed via VeriWave Providers Seen During Your Hospitalization Provider Specialty Primary office phone Anil Urbano MD Obstetrics & Gynecology [phone] Your Primary Care Physician (PCP) Primary Care Physician Office Phone Office Fax Jessica Ho [phone] You are allergic to the following Allergen Reactions Morphine Nausea and Vomiting Recent Documentation Height Weight Breastfeeding? BMI OB Status Smoking Status 1.702 m 86.2 kg Yes 29.76 kg/m2 Recent pregnancy Never Smoker Emergency Contacts Name Discharge Info Relation Home Work Mobile Nimesh Hensley DISCHARGE CAREGIVER [3] Spouse [3] [phone] Patient Belongings The following personal items are in your possession at time of discharge: Dental Appliances: None  Visual Aid: None      Home Medications: None   Jewelry: None  Clothing: At bedside    Other Valuables: None Please provide this summary of care documentation to your next provider. Signatures-by signing, you are acknowledging that this After Visit Summary has been reviewed with you and you have received a copy. Patient Signature:  ____________________________________________________________ Date:  ____________________________________________________________  
  
Fayrene Retort Provider Signature:  ____________________________________________________________ Date:  ____________________________________________________________

## 2018-06-06 PROCEDURE — 74011000250 HC RX REV CODE- 250: Performed by: OBSTETRICS & GYNECOLOGY

## 2018-06-06 PROCEDURE — 74011250636 HC RX REV CODE- 250/636: Performed by: OBSTETRICS & GYNECOLOGY

## 2018-06-06 PROCEDURE — 77030034850

## 2018-06-06 PROCEDURE — 75410000002 HC LABOR FEE PER 1 HR: Performed by: OBSTETRICS & GYNECOLOGY

## 2018-06-06 PROCEDURE — 65270000029 HC RM PRIVATE

## 2018-06-06 PROCEDURE — 74011250637 HC RX REV CODE- 250/637: Performed by: OBSTETRICS & GYNECOLOGY

## 2018-06-06 RX ORDER — DOCUSATE SODIUM 100 MG/1
100 CAPSULE, LIQUID FILLED ORAL
Status: DISCONTINUED | OUTPATIENT
Start: 2018-06-06 | End: 2018-06-08 | Stop reason: HOSPADM

## 2018-06-06 RX ORDER — LIDOCAINE HYDROCHLORIDE 10 MG/ML
5 INJECTION, SOLUTION EPIDURAL; INFILTRATION; INTRACAUDAL; PERINEURAL ONCE
Status: COMPLETED | OUTPATIENT
Start: 2018-06-06 | End: 2018-06-06

## 2018-06-06 RX ORDER — OXYTOCIN/RINGER'S LACTATE 20/1000 ML
125-500 PLASTIC BAG, INJECTION (ML) INTRAVENOUS ONCE
Status: COMPLETED | OUTPATIENT
Start: 2018-06-06 | End: 2018-06-06

## 2018-06-06 RX ORDER — IBUPROFEN 800 MG/1
800 TABLET ORAL EVERY 8 HOURS
Status: DISCONTINUED | OUTPATIENT
Start: 2018-06-06 | End: 2018-06-08 | Stop reason: HOSPADM

## 2018-06-06 RX ORDER — NALOXONE HYDROCHLORIDE 0.4 MG/ML
0.4 INJECTION, SOLUTION INTRAMUSCULAR; INTRAVENOUS; SUBCUTANEOUS AS NEEDED
Status: DISCONTINUED | OUTPATIENT
Start: 2018-06-06 | End: 2018-06-08 | Stop reason: HOSPADM

## 2018-06-06 RX ORDER — OXYCODONE AND ACETAMINOPHEN 5; 325 MG/1; MG/1
1 TABLET ORAL
Status: DISCONTINUED | OUTPATIENT
Start: 2018-06-06 | End: 2018-06-08 | Stop reason: HOSPADM

## 2018-06-06 RX ORDER — ACETAMINOPHEN 325 MG/1
650 TABLET ORAL
Status: DISCONTINUED | OUTPATIENT
Start: 2018-06-06 | End: 2018-06-08 | Stop reason: HOSPADM

## 2018-06-06 RX ORDER — HYDROCORTISONE ACETATE PRAMOXINE HCL 2.5; 1 G/100G; G/100G
CREAM TOPICAL AS NEEDED
Status: DISCONTINUED | OUTPATIENT
Start: 2018-06-06 | End: 2018-06-08 | Stop reason: HOSPADM

## 2018-06-06 RX ADMIN — SODIUM CHLORIDE, SODIUM LACTATE, POTASSIUM CHLORIDE, AND CALCIUM CHLORIDE 125 ML/HR: 600; 310; 30; 20 INJECTION, SOLUTION INTRAVENOUS at 09:32

## 2018-06-06 RX ADMIN — LIDOCAINE HYDROCHLORIDE 5 ML: 10 INJECTION, SOLUTION EPIDURAL; INFILTRATION; INTRACAUDAL; PERINEURAL at 16:59

## 2018-06-06 RX ADMIN — IBUPROFEN 800 MG: 800 TABLET ORAL at 18:21

## 2018-06-06 RX ADMIN — SODIUM CHLORIDE, SODIUM LACTATE, POTASSIUM CHLORIDE, AND CALCIUM CHLORIDE 125 ML/HR: 600; 310; 30; 20 INJECTION, SOLUTION INTRAVENOUS at 01:41

## 2018-06-06 RX ADMIN — ACETAMINOPHEN 650 MG: 325 TABLET ORAL at 21:31

## 2018-06-06 RX ADMIN — Medication 2 MILLI-UNITS/MIN: at 13:35

## 2018-06-06 RX ADMIN — Medication 5000 MILLI-UNITS/HR: at 17:36

## 2018-06-06 NOTE — PROGRESS NOTES
Pt seen for interval exam.  Her pitocin was stopped this morning due to contractions painful and occurring every 2 minutes. She was using nitrous oxide earlier in labor. Now, she is feeling very comfortable, contractions spaced, able to cope by using the birthing ball.   Cervix 1 hour ago: /0 by RN  FHR: Cat 1, reactive  [place]: ctx every 5-7 mins  Pt may have a snack and then will restart pitocin at 2 mu/min and increase as needed to titrate ctx to every 3-4 mins  Anticipate       Zana Mcgovern MD

## 2018-06-06 NOTE — PROGRESS NOTES
46 Dr Gricel Turcios at bedside , reviewed EFM strip, discussing options with pt. To do amnisure, US for VISHNU, allow to go home . Pt refused to go home, refused amnisure. To continue with labor agrees to SVE and AROM and proceed with delivery    0812 importaqnce of keeping FHR on EFM exp[lained to pt and FOB, both verbalizes understanding. Tolerating ctxs     9834 pt request pitocin turned down, refuses analgesic at this time. Pitocin decreased to 10 mu/min . Will inform Dr Lena Turcios called , awaiting return call to discuss POC    [phone] pt asked if pitocin can be tirned off to see if ctxs will aspace out ansd see if her body will take over. This nurse will call Dr Gricel Turcios to discuss. 1701 Dr Gricel Turcios aware of pts requested to turn off pitocin to see if ctxs will space out and her body take off. Per Dr Gricel Turcios , to turn off pitocin to give pt a break,    1005 pt requesting nitrous , nitrous protocol explained verbalizes understanding of action Se and only she may hold mask   1007 orders received from Dr rivas for nitrous. And to do SVE     1259 Dr Gricel Turcios at bedside , reviewed efm strip, discussing POC, pt feels ctxs are spaced out and tolerating with nitrous and is willing to restart pitocin at a low dose and if cervixs doesn't change on next SVE will increase pitocin as needed . FOB agrees with POC    Bedside and Verbal shift change report given to Melquiades Avalos RN (oncoming nurse) by Kat Trinidad RNC (offgoing nurse). Report included the following information SBAR, Kardex, Procedure Summary, Intake/Output and Recent Results.

## 2018-06-06 NOTE — PROGRESS NOTES
Labor Progress Note    Patient: Daniel Marcial YOB: 1990  Age: 34 y.o. Subjective:     Pt doing well, not really feeling any leaking currently, but had some overnight. Only feels mild contractions. Objective:     Vital Signs:  Visit Vitals    /71    Pulse 63    Temp 97.7 °F (36.5 °C)    Resp 16    Ht 5' 7\" (1.702 m)    Wt 86.2 kg (190 lb)    SpO2 98%    Breastfeeding Yes    BMI 29.76 kg/m2        Cervical Exam:   Cervical Exam  Dilation (cm): 3  Eff: 50 %  Station: -2  Vaginal exam done by? : Love  Membrane Status: AROM    Lab/Data Review:  Recent Results (from the past 24 hour(s))   CBC WITH AUTOMATED DIFF    Collection Time: 18  1:59 PM   Result Value Ref Range    WBC 9.5 3.6 - 11.0 K/uL    RBC 4.01 3.80 - 5.20 M/uL    HGB 13.3 11.5 - 16.0 g/dL    HCT 39.6 35.0 - 47.0 %    MCV 98.8 80.0 - 99.0 FL    MCH 33.2 26.0 - 34.0 PG    MCHC 33.6 30.0 - 36.5 g/dL    RDW 12.8 11.5 - 14.5 %    PLATELET 858 082 - 026 K/uL    MPV 10.5 8.9 - 12.9 FL    NRBC 0.0 0  WBC    ABSOLUTE NRBC 0.00 0.00 - 0.01 K/uL    NEUTROPHILS 74 32 - 75 %    LYMPHOCYTES 16 12 - 49 %    MONOCYTES 8 5 - 13 %    EOSINOPHILS 0 0 - 7 %    BASOPHILS 0 0 - 1 %    IMMATURE GRANULOCYTES 1 (H) 0.0 - 0.5 %    ABS. NEUTROPHILS 7.1 1.8 - 8.0 K/UL    ABS. LYMPHOCYTES 1.6 0.8 - 3.5 K/UL    ABS. MONOCYTES 0.7 0.0 - 1.0 K/UL    ABS. EOSINOPHILS 0.0 0.0 - 0.4 K/UL    ABS. BASOPHILS 0.0 0.0 - 0.1 K/UL    ABS. IMM.  GRANS. 0.1 (H) 0.00 - 0.04 K/UL    DF AUTOMATED       FHR: Cat 1, reactive  [place]: ctx every 2-3 mins    Assessment/Plan:     Active Problems:    Labor and delivery, indication for care (2018)      27yo  at 38w1d with PROM, now 18 hours, afebrile and RFS, GBS neg, cephalic  - cervix checked and no change, amniotomy performed of forebag with return of a large amount of clear fluid, RFS throughout  - cont pitocin, currently at 14 mu/min  - desires low pain medication intervention      Signed By: Jami Salinas MD     June 6, 2018

## 2018-06-06 NOTE — L&D DELIVERY NOTE
Delivery Summary  Patient: Raina Gaona             Circumcision:   desires  Additional Delivery Comments - Uncomplicated  after approximately 5 minutes of pushing of vigorous male . Placenta delivered intact in less than 10 minutes. A small first degree perineal laceration was repaired with 3-0 vicryl after injecting with 3cc of 1% lidocaine. Bilateral superficial medial labial lacerations were noted, but hemostatic and not repaired. Fundus firm and at the umbilicus. Information for the patient's :  Lois Muñiz, Male [[id number]]       Labor Events:    Labor: No   Rupture Date: 2018   Rupture Time: 7:52 AM   Rupture Type AROM   Amniotic Fluid Volume: Moderate    Amniotic Fluid Description: Clear None   Induction: Oxytocin       Augmentation: AROM   Labor Events: None     Cervical Ripening:     None     Delivery Events:  Episiotomy: None   Laceration(s): First degree perineal     Repaired: Yes    Number of Repair Packets: 1   Suture Type and Size: Vicryl 3-0     Estimated Blood Loss (ml): 200ml       Delivery Date: 2018    Delivery Time: 4:44 PM  Delivery Type: Vaginal, Spontaneous Delivery  Sex:  Male     Gestational Age: 43w4d   Delivery Clinician:  Yolis Leavitt  Living Status: Living   Delivery Location: L&D 211          APGARS  One minute Five minutes Ten minutes   Skin color: 1   1        Heart rate: 2   2        Grimace: 2   2        Muscle tone: 2   2        Breathin   2        Totals: 9   9            Presentation: Vertex    Position:   Occiput Anterior  Resuscitation Method:  Tactile Stimulation     Meconium Stained:        Cord Vessels:        Cord Events:    Cord Blood Sent?:  No    Blood Gases Sent?:  No    Placenta:  Date/Time:   4:52 PM  Removal: Spontaneous      Appearance: Normal      Measurements:  Birth Weight:        Birth Length:        Head Circumference:        Chest Circumference:       Abdominal Girth:       Other Providers:   Octavio Dunn SJ; MIGEL POTTER;LAURA DIXON;;;;;;;;CHARITY OLIVARES, Obstetrician;Primary Nurse;Primary  Nurse;Nicu Nurse;Neonatologist;Anesthesiologist;Crna;Nurse Practitioner;Midwife;Nursery Nurse           Cord pH:  none    Episiotomy: None   Laceration(s): First degree perineal     Estimated Blood Loss (ml): 200    Labor Events  Method: Oxytocin      Augmentation: AROM   Cervical Ripening:       None        Hospital Problems  Date Reviewed: 2014          Codes Class Noted POA    Labor and delivery, indication for care ICD-10-CM: O75.9  ICD-9-CM: 659.90  2018 Unknown              Operative Vaginal Delivery - none    Group B Strep:   Lab Results   Component Value Date/Time    GrBStrep, External negative 2018     Information for the patient's :  Adrian Srivastava, Male [[id number]]   No results found for: ABORH, PCTABR, PCTDIG, BILI, ABORHEXT, ABORH    No results found for: APH, APCO2, APO2, AHCO3, ABEC, ABDC, O2ST, EPHV, PCO2V, PO2V, HCO3V, EBEV, EBDV, SITE, RSCOM

## 2018-06-06 NOTE — PROGRESS NOTES
06/06/18  7:04 PM  SBAR report received from Jacqueline Mays RN. Assumed care of the patient at this time. 7:40 PM  Patient ambulated to the bathroom and voided without difficulty. Unable to measure the patients urine. 7:56 PM  Patient transferred to MIU room 310. Bedside SBAR report given to Jodi Amaral RN. Care of the patient turned over at this time. Infant ID bands verified.

## 2018-06-06 NOTE — PROGRESS NOTES
Labor Progress Note  Patient seen, fetal heart rate and contraction pattern evaluated, patient examined. Physical Exam:  Cervical Exam: 3cm dilated    Membranes:  ROM  Uterine Activity: Frequency: Every 4 minutes  Fetal Heart Rate: Reactive  Station -3    Assessment/Plan:  Reassuring fetal status, no cx change; still in latent phase. Continue to augment with pitocin.    Discussed with pt and family, agree

## 2018-06-07 PROCEDURE — 77030021125

## 2018-06-07 PROCEDURE — 74011250637 HC RX REV CODE- 250/637

## 2018-06-07 PROCEDURE — 65270000029 HC RM PRIVATE

## 2018-06-07 PROCEDURE — 74011250637 HC RX REV CODE- 250/637: Performed by: OBSTETRICS & GYNECOLOGY

## 2018-06-07 RX ORDER — PEPPERMINT OIL
SPIRIT ORAL
Status: COMPLETED
Start: 2018-06-07 | End: 2018-06-07

## 2018-06-07 RX ADMIN — OXYCODONE HYDROCHLORIDE AND ACETAMINOPHEN 1 TABLET: 5; 325 TABLET ORAL at 06:39

## 2018-06-07 RX ADMIN — OXYCODONE HYDROCHLORIDE AND ACETAMINOPHEN 1 TABLET: 5; 325 TABLET ORAL at 10:43

## 2018-06-07 RX ADMIN — IBUPROFEN 800 MG: 800 TABLET ORAL at 10:05

## 2018-06-07 RX ADMIN — OXYCODONE HYDROCHLORIDE AND ACETAMINOPHEN 1 TABLET: 5; 325 TABLET ORAL at 02:00

## 2018-06-07 RX ADMIN — OXYCODONE HYDROCHLORIDE AND ACETAMINOPHEN 1 TABLET: 5; 325 TABLET ORAL at 15:23

## 2018-06-07 RX ADMIN — DOCUSATE SODIUM 100 MG: 100 CAPSULE, LIQUID FILLED ORAL at 10:43

## 2018-06-07 RX ADMIN — IBUPROFEN 800 MG: 800 TABLET ORAL at 02:00

## 2018-06-07 RX ADMIN — IBUPROFEN 800 MG: 800 TABLET ORAL at 18:31

## 2018-06-07 RX ADMIN — ACETAMINOPHEN 650 MG: 325 TABLET ORAL at 21:09

## 2018-06-07 RX ADMIN — Medication: at 15:23

## 2018-06-07 NOTE — LACTATION NOTE
This note was copied from a baby's chart. Pt will successfully establish breastfeeding by feeding in response to early feeding cues   or wake every 3h, will obtain deep latch, and will keep log of feedings/output. Taught to BF at hunger cues and or q 2-3 hrs and to offer 10-20 drops of hand expressed colostrum at any non-feeds. Breast Assessment  Left Breast: Medium  Left Nipple: Everted, Intact  Right Breast: Medium  Right Nipple: Everted, Intact  Breast- Feeding Assessment  Attends Breast-Feeding Classes: Yes  Breast-Feeding Experience: Yes (2 years)  Breast Trauma/Surgery: No  Type/Quality: Good (per Mom)  Lactation Consultant Visits  Breast-Feedings: Good  (per Mom)  Mother/Infant Observation  Mother Observation: Alignment, Lets baby end feeding, Sleepy after feeding, Breast comfortable, Recognizes feeding cues, Holds breast (per Mom)  Infant Observation: Feeding cues, Lips flanged, lower, Relaxed after feeding, Opens mouth, Latches nipple and aereolae, Rhythmic suck, Lips flanged, upper (per Mom)  LATCH Documentation  Latch: Grasps breast, tongue down, lips flanged, rhythmic sucking (per Mom)  Audible Swallowing: Spontaneous and intermittent (24 hours old)  Type of Nipple: Everted (after stimulation)  Comfort (Breast/Nipple): Soft/non-tender  Hold (Positioning): No assist from staff, mother able to position/hold infant  LATCH Score: 8    Mom just fed baby. \"I have had so many visitors, I just want to be alone with baby. Breast feeding is going well. He just fed well on the first side. You don't need to observe today. \"

## 2018-06-07 NOTE — ROUTINE PROCESS
Bedside and Verbal shift change report given to Sarbjit Davis RN (oncoming nurse) by Myra Freeman RN (offgoing nurse). Report included the following information SBAR, Kardex, Intake/Output and MAR.

## 2018-06-07 NOTE — LACTATION NOTE
This note was copied from a baby's chart. Discussed with mother her plan for feeding. Reviewed the benefits of exclusive breast milk feeding during the hospital stay. Informed her of the risks of using formula to supplement in the first few days of life as well as the benefits of successful breast milk feeding; referred her to the Breastfeeding booklet about this information. She acknowledges understanding of information reviewed and states that it is her plan to breastfeed her infant. Will support her choice and offer additional information as needed. Reviewed breastfeeding basics:  How milk is made and normal  breastfeeding behaviors discussed. Supply and demand,  stomach size, early feeding cues, skin to skin bonding with comfortable positioning and baby led latch-on reviewed. How to identify signs of successful breastfeeding sessions reviewed; education on assymetrical latch, signs of effective latching vs shallow, in-effective latching, normal  feeding frequency and duration and expected infant output discussed. Normal course of breastfeeding discussed including the AAP's recommendation that children receive exclusive breast milk feedings for the first six months of life with breast milk feedings to continue through the first year of life and/or beyond as complimentary table foods are added. Breastfeeding Booklet and Warm line information provided with discussion. Discussed typical  weight loss and the importance of pediatrician appointment within 24-48 hours of discharge, at 2 weeks of life and normalcy of requesting pediatric weight checks as needed in between visits. Mom breast fed first child for 2 years. Instructed Mom to call [facility] when she gest ready to feed. Baby was just cir'cd and uninterested in feeding.

## 2018-06-07 NOTE — ROUTINE PROCESS
Bedside shift change report given to Tirso Moran RN (oncoming nurse) by Sarbjit Davis RN (offgoing nurse). Report included the following information SBAR, Kardex, Intake/Output and MAR.

## 2018-06-07 NOTE — ROUTINE PROCESS
SBAR IN Report: Mother    Verbal report received from Bang Bhatti (full name & credentials) on this patient, who is now being transferred from L&D (unit) for routine progression of care. Report consisted of patient's Situation, Background, Assessment and Recommendations (SBAR). [place] ID bands were compared with the identification form, and verified with the patient and transferring nurse. Information from the SBAR, Kardex, Intake/Output and MAR and the [place] Report was reviewed with the transferring nurse; opportunity for questions and clarification provided.

## 2018-06-07 NOTE — PROGRESS NOTES
Patient is stating a 10/10 pain at her perineum and unable to void. This nurse talked to Dr. Hilary Schafer who stated to place a douglas.

## 2018-06-07 NOTE — PROGRESS NOTES
Post-Partum Day Number 1 Progress Note    Melida Hensley     Assessment: Doing well, post partum day 1    Plan:  1. Continue routine postpartum and perineal care as well as maternal education. 2. The risks and benefits of the circumcision  procedure and anesthesia including: bleeding, infection, variability of cosmetic results were discussed at length with the mother. She is aware that future repeat procedures may be necessary. She gives informed consent to proceed as noted and her questions are answered. Information for the patient's :  Cora Kulkarni, Male [[id number]]   Vaginal, Spontaneous Delivery   Patient doing well without significant complaint. Voiding without difficulty, normal lochia. Vitals:  Visit Vitals    /56 (BP 1 Location: Left arm, BP Patient Position: At rest)    Pulse 63    Temp 97.7 °F (36.5 °C)    Resp 16    Ht 5' 7\" (1.702 m)    Wt 86.2 kg (190 lb)    SpO2 98%    Breastfeeding Yes    BMI 29.76 kg/m2     Temp (24hrs), Av.9 °F (36.6 °C), Min:97.7 °F (36.5 °C), Max:98.3 °F (36.8 °C)        Exam:   Patient without distress. Abdomen soft, fundus firm, nontender                Lower extremities are negative for swelling, cords or tenderness.     Labs:     Lab Results   Component Value Date/Time    WBC 9.5 2018 01:59 PM    WBC 15.3 (H) 2013 10:20 PM    WBC 12.2 (H) 2013 01:20 PM    WBC 12.8 (H) 2013 09:22 PM    WBC 11.3 (H) 10/14/2013 01:12 PM    WBC 12.3 (H) 2013 02:15 PM    WBC 8.8 06/15/2013 10:39 AM    HGB 13.3 2018 01:59 PM    HGB 12.8 2013 10:20 PM    HGB 13.0 2013 01:20 PM    HGB 12.6 2013 09:22 PM    HGB 12.4 10/14/2013 01:12 PM    HGB 12.7 2013 02:15 PM    HGB 12.7 06/15/2013 10:39 AM    HCT 39.6 2018 01:59 PM    HCT 36.6 2013 10:20 PM    HCT 37.5 2013 01:20 PM    HCT 36.3 2013 09:22 PM    HCT 37.9 10/14/2013 01:12 PM    HCT 36.0 2013 02:15 PM    HCT 37.8 06/15/2013 10:39 AM    PLATELET 586 57/70/9889 01:59 PM    PLATELET 508 88/74/0190 10:20 PM    PLATELET 195 72/66/7917 01:20 PM    PLATELET 768 02/62/6699 09:22 PM    PLATELET 697 38/93/6095 01:12 PM    PLATELET 848 20/91/0442 02:15 PM    PLATELET 112 31/07/4257 10:39 AM    Hgb, External 12.4  g/dL  -  [facility] 10/14/2013    Hgb, External 12.7  g/dL  -  [facility] 06/18/2013    Hct, External 37.9  %  -  [facility] 10/14/2013    Hct, External 37.8  %  -  [facility] 06/18/2013    Platelet cnt., External 251  x10E3/uL  -  [facility] 10/14/2013    Platelet cnt., External 287  x10E3/uL  -  [facility] 06/18/2013       No results found for this or any previous visit (from the past 24 hour(s)).

## 2018-06-08 VITALS
HEART RATE: 54 BPM | WEIGHT: 190 LBS | TEMPERATURE: 98.3 F | SYSTOLIC BLOOD PRESSURE: 110 MMHG | RESPIRATION RATE: 16 BRPM | DIASTOLIC BLOOD PRESSURE: 64 MMHG | HEIGHT: 67 IN | OXYGEN SATURATION: 98 % | BODY MASS INDEX: 29.82 KG/M2

## 2018-06-08 PROCEDURE — 74011250637 HC RX REV CODE- 250/637: Performed by: OBSTETRICS & GYNECOLOGY

## 2018-06-08 PROCEDURE — 77030021125

## 2018-06-08 RX ORDER — OXYCODONE AND ACETAMINOPHEN 5; 325 MG/1; MG/1
1 TABLET ORAL
Qty: 15 TAB | Refills: 0 | Status: SHIPPED | OUTPATIENT
Start: 2018-06-08 | End: 2019-03-20

## 2018-06-08 RX ORDER — IBUPROFEN 600 MG/1
600 TABLET ORAL
Qty: 40 TAB | Refills: 2 | Status: SHIPPED | OUTPATIENT
Start: 2018-06-08 | End: 2019-03-20

## 2018-06-08 RX ADMIN — DOCUSATE SODIUM 100 MG: 100 CAPSULE, LIQUID FILLED ORAL at 08:32

## 2018-06-08 RX ADMIN — IBUPROFEN 800 MG: 800 TABLET ORAL at 11:32

## 2018-06-08 RX ADMIN — IBUPROFEN 800 MG: 800 TABLET ORAL at 02:30

## 2018-06-08 RX ADMIN — ACETAMINOPHEN 650 MG: 325 TABLET ORAL at 01:48

## 2018-06-08 RX ADMIN — ACETAMINOPHEN 650 MG: 325 TABLET ORAL at 05:46

## 2018-06-08 RX ADMIN — ACETAMINOPHEN 650 MG: 325 TABLET ORAL at 10:26

## 2018-06-08 NOTE — DISCHARGE INSTRUCTIONS
POST DELIVERY DISCHARGE INSTRUCTIONS    Name: Ariel Ndiaye  YOB: 1990  Primary Diagnosis: Active Problems:    Labor and delivery, indication for care (2018)        General:     Diet/Diet Restrictions:  · Eight 8-ounce glasses of fluid daily (water, juices); avoid excessive caffeine intake. · Meals/snacks as desired which are high in fiber and carbohydrates and low in fat and cholesterol. Physical Activity / Restrictions / Safety:     · Avoid heavy lifting, no more that 8 lbs. For 2-3 weeks;   · Limit use of stairs to 2 times daily for the first week home. · No driving for one week. · Avoid intercourse 4-6 weeks, no douching or tampon use. · Check with obstetrician before starting or resuming an exercise program.      Discharge Instructions/Special Treatment/Home Care Needs:     · Continue prenatal vitamins. · Continue to use squirt bottle with warm water on your episiotomy after each bathroom use until bleeding stops. · If steri-strips applied to your incision, remove in 7-10 days. Call your doctor for the following:     · Fever over 101 degrees by mouth. · Vaginal bleeding heavier than a normal menstrual period or clots larger than a golf ball. · Red streaks or increased swelling of legs, painful red streaks on your breast.  · Painful urination, constipation and increased pain or swelling or discharge with your incision. · If you feel extremely anxious or overwhelmed. · If you have thoughts of harming yourself and/or your baby. Pain Management:     · Take Acetaminophen (Tylenol) or Ibuprofen (Advil, Motrin), as directed for pain. · Use a warm Sitz bath 3 times daily to relieve episiotomy or hemorrhoidal discomfort. · For hemorrhoidal discomfort, use Tucks and Anusol cream as needed and directed. · Heating pad to  incision as needed.      Follow-Up Care:     Appointment with MD:   Follow-up Appointments   Procedures    FOLLOW UP VISIT Appointment in: 6 Weeks     Standing Status:   Standing     Number of Occurrences:   1     Order Specific Question:   Appointment in     Answer:   6 Weeks     Telephone number: [phone]    Signed By: Yolis Leavitt MD                                                                                                   Date: 6/8/2018 Time: 8:15 AM

## 2018-06-08 NOTE — LACTATION NOTE
This note was copied from a baby's chart. Mom states\" breast feeding is going well. I tired that postion you showed me and he really likes it. He stays awake more. \"  Instructed Mom to call Lc when she gest ready to feed

## 2018-06-08 NOTE — PROGRESS NOTES
Post-Partum Day Number 2 Progress Note    Melida Hensley     Assessment: Doing well, post partum day 2    Plan:   1. Discharge home today  2. Follow up in office in 6 weeks with Tami Galindo MD  3. Post partum activity advised, diet as tolerated  4. Discharge Medications: ibuprofen, percocet and medications prior to admission    Information for the patient's :  Casa Rowe, Male [[id number]]   Vaginal, Spontaneous Delivery   Patient doing well without significant complaint. Voiding without difficulty, normal lochia. Vitals:  Visit Vitals    /64 (BP 1 Location: Right arm, BP Patient Position: At rest)    Pulse (!) 54    Temp 98.3 °F (36.8 °C)    Resp 16    Ht 5' 7\" (1.702 m)    Wt 86.2 kg (190 lb)    SpO2 98%    Breastfeeding Yes    BMI 29.76 kg/m2     Temp (24hrs), Av.2 °F (36.8 °C), Min:97.9 °F (36.6 °C), Max:98.3 °F (36.8 °C)      Exam:         Patient without distress. Abdomen soft, fundus firm, nontender                 Lower extremities are negative for swelling, cords or tenderness.     Labs:     Lab Results   Component Value Date/Time    WBC 9.5 2018 01:59 PM    WBC 15.3 (H) 2013 10:20 PM    WBC 12.2 (H) 2013 01:20 PM    WBC 12.8 (H) 2013 09:22 PM    WBC 11.3 (H) 10/14/2013 01:12 PM    WBC 12.3 (H) 2013 02:15 PM    WBC 8.8 06/15/2013 10:39 AM    HGB 13.3 2018 01:59 PM    HGB 12.8 2013 10:20 PM    HGB 13.0 2013 01:20 PM    HGB 12.6 2013 09:22 PM    HGB 12.4 10/14/2013 01:12 PM    HGB 12.7 2013 02:15 PM    HGB 12.7 06/15/2013 10:39 AM    HCT 39.6 2018 01:59 PM    HCT 36.6 2013 10:20 PM    HCT 37.5 2013 01:20 PM    HCT 36.3 2013 09:22 PM    HCT 37.9 10/14/2013 01:12 PM    HCT 36.0 2013 02:15 PM    HCT 37.8 06/15/2013 10:39 AM    PLATELET 228  01:59 PM    PLATELET 847  10:20 PM    PLATELET 265  01:20 PM    PLATELET 274  09:22 PM    PLATELET 251 10/14/2013 01:12 PM    PLATELET 592 69/17/0339 02:15 PM    PLATELET 059 47/85/5426 10:39 AM    Hgb, External 12.4  g/dL  -  [facility] 10/14/2013    Hgb, External 12.7  g/dL  -  [facility] 06/18/2013    Hct, External 37.9  %  -  [facility] 10/14/2013    Hct, External 37.8  %  -  [facility] 06/18/2013    Platelet cnt., External 251  x10E3/uL  -  [facility] 10/14/2013    Platelet cnt., External 287  x10E3/uL  -  [facility] 06/18/2013       No results found for this or any previous visit (from the past 24 hour(s)).

## 2018-06-08 NOTE — LACTATION NOTE
This note was copied from a baby's chart. Pt will successfully establish breastfeeding by feeding in response to early feeding cues   or wake every 3h, will obtain deep latch, and will keep log of feedings/output. Taught to BF at hunger cues and or q 2-3 hrs and to offer 10-20 drops of hand expressed colostrum at any non-feeds. Breast Assessment  Left Breast: Medium  Left Nipple: Everted, Intact  Right Breast: Medium  Right Nipple: Everted, Intact  Breast- Feeding Assessment  Attends Breast-Feeding Classes: Yes  Breast-Feeding Experience: Yes (2 years)  Breast Trauma/Surgery: No  Type/Quality: Good  Lactation Consultant Visits  Breast-Feedings: Good   Mother/Infant Observation  Mother Observation: Alignment, Sleepy after feeding, Lets baby end feeding, Nipple round on release, Breast comfortable, Recognizes feeding cues, Holds breast  Infant Observation: Feeding cues, Lips flanged, lower, Relaxed after feeding, Opens mouth, Latches nipple and aereolae, Frenulum checked, Rhythmic suck, Audible swallows  LATCH Documentation  Latch: Grasps breast, tongue down, lips flanged, rhythmic sucking  Audible Swallowing: Spontaneous and intermittent (24 hours old)  Type of Nipple: Everted (after stimulation)  Comfort (Breast/Nipple): Soft/non-tender  Hold (Positioning): No assist from staff, mother able to position/hold infant  LATCH Score: 10     Chart shows numerous feedings, void, stool WNL. Discussed importance of monitoring outputs and feedings on first week of life. Discussed ways to tell if baby is  getting enough breast milk, ie  voids and stools, change in color of stool, and return to birth wt within 2 weeks. Follow up with pediatrician visit for weight check in 1-2 days (per AAP guidelines.)  Encouraged to call Warm Line  [phone] or [facility] at [phone] for any questions/problems that arise.  Mother also given breastfeeding support group dates and times for any future needsAnticipatory guidance given.  Questions answered. Discussed signs of baby's allergy, excema. Discussed engorgement management, when breast are soft and flat you are making more milk than when hard and engorged. If you should have to take a medication and MD says can't breast feed contact lactation office. Breast feed if you or the baby gets sick to pass along natural immunologic protection.     Confident breast feeding Mom

## 2018-06-08 NOTE — ROUTINE PROCESS
Bedside shift change report given to Tracey Puga rn (oncoming nurse) by Karsten Srivastava rn (offgoing nurse). Report included the following information SBAR, Kardex, Procedure Summary, Intake/Output, MAR, Accordion, Recent Results and Med Rec Status.

## 2018-06-08 NOTE — LACTATION NOTE
This note was copied from a baby's chart.   Instructed mom to call [facility] when she gets ready to feed

## 2018-06-08 NOTE — DISCHARGE SUMMARY
Obstetrical Discharge Summary     Name: Marcelle Plascencia MRN: [id number]  SSN: [id number]    YOB: 1990  Age: 34 y.o. Sex: female      Admit Date: 2018    Discharge Date: 2018     Admitting Physician: Morgan Ortiz MD     Attending Physician:  Josefa Callaway MD     Admission Diagnoses: Labor and delivery, indication for care    Discharge Diagnoses:   Information for the patient's :  Jeffery Mills, Male [[id number]]   Delivery of a 2.971 kg male infant via Vaginal, Spontaneous Delivery on 2018 at 4:44 PM  by . Apgars were 9 and 9. Additional Diagnoses:   Hospital Problems  Date Reviewed: 2014          Codes Class Noted POA    Labor and delivery, indication for care ICD-10-CM: O75.9  ICD-9-CM: 659.90  2018 Unknown             Lab Results   Component Value Date/Time    Rubella, External positive 2017    GrBStrep, External negative 2018       Hospital Course: Normal hospital course following the delivery. Disposition at Discharge: Home or self care    Discharged Condition: Stable    Patient Instructions:   Current Discharge Medication List      START taking these medications    Details   oxyCODONE-acetaminophen (PERCOCET) 5-325 mg per tablet Take 1 Tab by mouth every six (6) hours as needed for Pain. Max Daily Amount: 4 Tabs. Qty: 15 Tab, Refills: 0    Associated Diagnoses: Vaginal delivery      ibuprofen (MOTRIN) 600 mg tablet Take 1 Tab by mouth every six (6) hours as needed for Pain. Qty: 40 Tab, Refills: 2         CONTINUE these medications which have NOT CHANGED    Details   PNV38-Iron Cbn&Gluc-FA-DSS-DHA 35-1- mg cmpk Take 1 Tab by mouth. Reference my discharge instructions.     Follow-up Appointments   Procedures    FOLLOW UP VISIT Appointment in: 6 Weeks     Standing Status:   Standing     Number of Occurrences:   1     Order Specific Question:   Appointment in     Answer:   6 Weeks        Signed By:  Burna Hatchet, MD      2018

## 2019-03-20 ENCOUNTER — HOSPITAL ENCOUNTER (OUTPATIENT)
Age: 29
Setting detail: OUTPATIENT SURGERY
Discharge: HOME | End: 2019-03-20
Attending: EMERGENCY MEDICINE | Admitting: EMERGENCY MEDICINE
Payer: COMMERCIAL

## 2019-03-20 ENCOUNTER — APPOINTMENT (OUTPATIENT)
Dept: CT IMAGING | Age: 29
End: 2019-03-20
Attending: NURSE PRACTITIONER
Payer: COMMERCIAL

## 2019-03-20 ENCOUNTER — ANESTHESIA EVENT (OUTPATIENT)
Dept: SURGERY | Age: 29
End: 2019-03-20
Payer: COMMERCIAL

## 2019-03-20 ENCOUNTER — ANESTHESIA (OUTPATIENT)
Dept: SURGERY | Age: 29
End: 2019-03-20
Payer: COMMERCIAL

## 2019-03-20 ENCOUNTER — APPOINTMENT (OUTPATIENT)
Dept: GENERAL RADIOLOGY | Age: 29
End: 2019-03-20
Attending: UROLOGY
Payer: COMMERCIAL

## 2019-03-20 VITALS
TEMPERATURE: 97.8 F | HEART RATE: 63 BPM | BODY MASS INDEX: 23.54 KG/M2 | WEIGHT: 150 LBS | HEIGHT: 67 IN | RESPIRATION RATE: 16 BRPM | SYSTOLIC BLOOD PRESSURE: 107 MMHG | OXYGEN SATURATION: 99 % | DIASTOLIC BLOOD PRESSURE: 61 MMHG

## 2019-03-20 DIAGNOSIS — N20.0 KIDNEY STONE: Primary | ICD-10-CM

## 2019-03-20 PROBLEM — N13.2 URETERAL STONE WITH HYDRONEPHROSIS: Status: ACTIVE | Noted: 2019-03-20

## 2019-03-20 LAB
ALBUMIN SERPL-MCNC: 3.9 G/DL (ref 3.5–5)
ALBUMIN/GLOB SERPL: 1.1 {RATIO} (ref 1.1–2.2)
ALP SERPL-CCNC: 122 U/L (ref 45–117)
ALT SERPL-CCNC: 22 U/L (ref 12–78)
ANION GAP SERPL CALC-SCNC: 7 MMOL/L (ref 5–15)
APPEARANCE UR: CLEAR
AST SERPL-CCNC: 13 U/L (ref 15–37)
BACTERIA URNS QL MICRO: NEGATIVE /HPF
BASOPHILS # BLD: 0 K/UL (ref 0–0.1)
BASOPHILS NFR BLD: 0 % (ref 0–1)
BILIRUB SERPL-MCNC: 1 MG/DL (ref 0.2–1)
BILIRUB UR QL: NEGATIVE
BUN SERPL-MCNC: 19 MG/DL (ref 6–20)
BUN/CREAT SERPL: 26 (ref 12–20)
CALCIUM SERPL-MCNC: 9 MG/DL (ref 8.5–10.1)
CHLORIDE SERPL-SCNC: 108 MMOL/L (ref 97–108)
CO2 SERPL-SCNC: 23 MMOL/L (ref 21–32)
COLOR UR: ABNORMAL
CREAT SERPL-MCNC: 0.74 MG/DL (ref 0.55–1.02)
DIFFERENTIAL METHOD BLD: NORMAL
EOSINOPHIL # BLD: 0 K/UL (ref 0–0.4)
EOSINOPHIL NFR BLD: 0 % (ref 0–7)
EPITH CASTS URNS QL MICRO: ABNORMAL /LPF
ERYTHROCYTE [DISTWIDTH] IN BLOOD BY AUTOMATED COUNT: 12.4 % (ref 11.5–14.5)
GLOBULIN SER CALC-MCNC: 3.7 G/DL (ref 2–4)
GLUCOSE SERPL-MCNC: 115 MG/DL (ref 65–100)
GLUCOSE UR STRIP.AUTO-MCNC: NEGATIVE MG/DL
HCG UR QL: NEGATIVE
HCT VFR BLD AUTO: 40.6 % (ref 35–47)
HGB BLD-MCNC: 13.3 G/DL (ref 11.5–16)
HGB UR QL STRIP: ABNORMAL
HYALINE CASTS URNS QL MICRO: ABNORMAL /LPF (ref 0–5)
IMM GRANULOCYTES # BLD AUTO: 0 K/UL (ref 0–0.04)
IMM GRANULOCYTES NFR BLD AUTO: 0 % (ref 0–0.5)
KETONES UR QL STRIP.AUTO: NEGATIVE MG/DL
LEUKOCYTE ESTERASE UR QL STRIP.AUTO: NEGATIVE
LIPASE SERPL-CCNC: 107 U/L (ref 73–393)
LYMPHOCYTES # BLD: 3.3 K/UL (ref 0.8–3.5)
LYMPHOCYTES NFR BLD: 40 % (ref 12–49)
MCH RBC QN AUTO: 31.2 PG (ref 26–34)
MCHC RBC AUTO-ENTMCNC: 32.8 G/DL (ref 30–36.5)
MCV RBC AUTO: 95.3 FL (ref 80–99)
MONOCYTES # BLD: 0.6 K/UL (ref 0–1)
MONOCYTES NFR BLD: 7 % (ref 5–13)
NEUTS SEG # BLD: 4.3 K/UL (ref 1.8–8)
NEUTS SEG NFR BLD: 53 % (ref 32–75)
NITRITE UR QL STRIP.AUTO: NEGATIVE
NRBC # BLD: 0 K/UL (ref 0–0.01)
NRBC BLD-RTO: 0 PER 100 WBC
PH UR STRIP: 6 [PH] (ref 5–8)
PLATELET # BLD AUTO: 322 K/UL (ref 150–400)
PMV BLD AUTO: 10.6 FL (ref 8.9–12.9)
POTASSIUM SERPL-SCNC: 4 MMOL/L (ref 3.5–5.1)
PROT SERPL-MCNC: 7.6 G/DL (ref 6.4–8.2)
PROT UR STRIP-MCNC: NEGATIVE MG/DL
RBC # BLD AUTO: 4.26 M/UL (ref 3.8–5.2)
RBC #/AREA URNS HPF: ABNORMAL /HPF (ref 0–5)
SODIUM SERPL-SCNC: 138 MMOL/L (ref 136–145)
SP GR UR REFRACTOMETRY: 1.02 (ref 1–1.03)
UROBILINOGEN UR QL STRIP.AUTO: 1 EU/DL (ref 0.2–1)
WBC # BLD AUTO: 8.2 K/UL (ref 3.6–11)
WBC URNS QL MICRO: ABNORMAL /HPF (ref 0–4)

## 2019-03-20 PROCEDURE — 74011250636 HC RX REV CODE- 250/636

## 2019-03-20 PROCEDURE — 96374 THER/PROPH/DIAG INJ IV PUSH: CPT

## 2019-03-20 PROCEDURE — 74011250636 HC RX REV CODE- 250/636: Performed by: ANESTHESIOLOGY

## 2019-03-20 PROCEDURE — 87205 SMEAR GRAM STAIN: CPT

## 2019-03-20 PROCEDURE — 74011250636 HC RX REV CODE- 250/636: Performed by: NURSE PRACTITIONER

## 2019-03-20 PROCEDURE — 74011250637 HC RX REV CODE- 250/637: Performed by: NURSE PRACTITIONER

## 2019-03-20 PROCEDURE — 77030008684 HC TU ET CUF COVD -B: Performed by: ANESTHESIOLOGY

## 2019-03-20 PROCEDURE — 76210000021 HC REC RM PH II 0.5 TO 1 HR: Performed by: UROLOGY

## 2019-03-20 PROCEDURE — 76210000006 HC OR PH I REC 0.5 TO 1 HR: Performed by: UROLOGY

## 2019-03-20 PROCEDURE — 83690 ASSAY OF LIPASE: CPT

## 2019-03-20 PROCEDURE — 96375 TX/PRO/DX INJ NEW DRUG ADDON: CPT

## 2019-03-20 PROCEDURE — 74176 CT ABD & PELVIS W/O CONTRAST: CPT

## 2019-03-20 PROCEDURE — 74011000250 HC RX REV CODE- 250

## 2019-03-20 PROCEDURE — 74011250637 HC RX REV CODE- 250/637: Performed by: UROLOGY

## 2019-03-20 PROCEDURE — 96361 HYDRATE IV INFUSION ADD-ON: CPT

## 2019-03-20 PROCEDURE — 77030018832 HC SOL IRR H20 ICUM -A: Performed by: UROLOGY

## 2019-03-20 PROCEDURE — 74011250636 HC RX REV CODE- 250/636: Performed by: UROLOGY

## 2019-03-20 PROCEDURE — C1769 GUIDE WIRE: HCPCS | Performed by: UROLOGY

## 2019-03-20 PROCEDURE — C1894 INTRO/SHEATH, NON-LASER: HCPCS | Performed by: UROLOGY

## 2019-03-20 PROCEDURE — 36415 COLL VENOUS BLD VENIPUNCTURE: CPT

## 2019-03-20 PROCEDURE — 77030026438 HC STYL ET INTUB CARD -A: Performed by: ANESTHESIOLOGY

## 2019-03-20 PROCEDURE — 96376 TX/PRO/DX INJ SAME DRUG ADON: CPT

## 2019-03-20 PROCEDURE — 99285 EMERGENCY DEPT VISIT HI MDM: CPT

## 2019-03-20 PROCEDURE — 80053 COMPREHEN METABOLIC PANEL: CPT

## 2019-03-20 PROCEDURE — 81001 URINALYSIS AUTO W/SCOPE: CPT

## 2019-03-20 PROCEDURE — 76060000032 HC ANESTHESIA 0.5 TO 1 HR: Performed by: UROLOGY

## 2019-03-20 PROCEDURE — 76010000138 HC OR TIME 0.5 TO 1 HR: Performed by: UROLOGY

## 2019-03-20 PROCEDURE — C2617 STENT, NON-COR, TEM W/O DEL: HCPCS | Performed by: UROLOGY

## 2019-03-20 PROCEDURE — C1758 CATHETER, URETERAL: HCPCS | Performed by: UROLOGY

## 2019-03-20 PROCEDURE — 96372 THER/PROPH/DIAG INJ SC/IM: CPT

## 2019-03-20 PROCEDURE — 74420 UROGRAPHY RTRGR +-KUB: CPT

## 2019-03-20 PROCEDURE — 74011636320 HC RX REV CODE- 636/320: Performed by: UROLOGY

## 2019-03-20 PROCEDURE — 85025 COMPLETE CBC W/AUTO DIFF WBC: CPT

## 2019-03-20 PROCEDURE — 81025 URINE PREGNANCY TEST: CPT

## 2019-03-20 PROCEDURE — 77030019927 HC TBNG IRR CYSTO BAXT -A: Performed by: UROLOGY

## 2019-03-20 DEVICE — URETERAL STENT
Type: IMPLANTABLE DEVICE | Site: URETER | Status: NON-FUNCTIONAL
Brand: POLARIS™ ULTRA
Removed: 2019-03-21

## 2019-03-20 RX ORDER — FENTANYL CITRATE 50 UG/ML
25 INJECTION, SOLUTION INTRAMUSCULAR; INTRAVENOUS ONCE
Status: COMPLETED | OUTPATIENT
Start: 2019-03-20 | End: 2019-03-20

## 2019-03-20 RX ORDER — PROPOFOL 10 MG/ML
INJECTION, EMULSION INTRAVENOUS
Status: DISCONTINUED | OUTPATIENT
Start: 2019-03-20 | End: 2019-03-20 | Stop reason: HOSPADM

## 2019-03-20 RX ORDER — NALOXONE HYDROCHLORIDE 0.4 MG/ML
0.04 INJECTION, SOLUTION INTRAMUSCULAR; INTRAVENOUS; SUBCUTANEOUS
Status: DISCONTINUED | OUTPATIENT
Start: 2019-03-20 | End: 2019-03-20 | Stop reason: HOSPADM

## 2019-03-20 RX ORDER — SODIUM CHLORIDE, SODIUM LACTATE, POTASSIUM CHLORIDE, CALCIUM CHLORIDE 600; 310; 30; 20 MG/100ML; MG/100ML; MG/100ML; MG/100ML
125 INJECTION, SOLUTION INTRAVENOUS CONTINUOUS
Status: DISCONTINUED | OUTPATIENT
Start: 2019-03-20 | End: 2019-03-20 | Stop reason: HOSPADM

## 2019-03-20 RX ORDER — KETOROLAC TROMETHAMINE 30 MG/ML
30 INJECTION, SOLUTION INTRAMUSCULAR; INTRAVENOUS
Status: COMPLETED | OUTPATIENT
Start: 2019-03-20 | End: 2019-03-20

## 2019-03-20 RX ORDER — FENTANYL CITRATE 50 UG/ML
INJECTION, SOLUTION INTRAMUSCULAR; INTRAVENOUS AS NEEDED
Status: DISCONTINUED | OUTPATIENT
Start: 2019-03-20 | End: 2019-03-20 | Stop reason: HOSPADM

## 2019-03-20 RX ORDER — PROPOFOL 10 MG/ML
INJECTION, EMULSION INTRAVENOUS AS NEEDED
Status: DISCONTINUED | OUTPATIENT
Start: 2019-03-20 | End: 2019-03-20 | Stop reason: HOSPADM

## 2019-03-20 RX ORDER — SODIUM CHLORIDE 0.9 % (FLUSH) 0.9 %
5-40 SYRINGE (ML) INJECTION AS NEEDED
Status: DISCONTINUED | OUTPATIENT
Start: 2019-03-20 | End: 2019-03-20 | Stop reason: HOSPADM

## 2019-03-20 RX ORDER — GLYCOPYRROLATE 0.2 MG/ML
INJECTION INTRAMUSCULAR; INTRAVENOUS AS NEEDED
Status: DISCONTINUED | OUTPATIENT
Start: 2019-03-20 | End: 2019-03-20 | Stop reason: HOSPADM

## 2019-03-20 RX ORDER — ONDANSETRON 2 MG/ML
4 INJECTION INTRAMUSCULAR; INTRAVENOUS
Status: COMPLETED | OUTPATIENT
Start: 2019-03-20 | End: 2019-03-20

## 2019-03-20 RX ORDER — ROCURONIUM BROMIDE 10 MG/ML
INJECTION, SOLUTION INTRAVENOUS AS NEEDED
Status: DISCONTINUED | OUTPATIENT
Start: 2019-03-20 | End: 2019-03-20 | Stop reason: HOSPADM

## 2019-03-20 RX ORDER — SODIUM CHLORIDE 0.9 % (FLUSH) 0.9 %
5-40 SYRINGE (ML) INJECTION EVERY 8 HOURS
Status: DISCONTINUED | OUTPATIENT
Start: 2019-03-20 | End: 2019-03-20 | Stop reason: HOSPADM

## 2019-03-20 RX ORDER — DEXAMETHASONE SODIUM PHOSPHATE 4 MG/ML
INJECTION, SOLUTION INTRA-ARTICULAR; INTRALESIONAL; INTRAMUSCULAR; INTRAVENOUS; SOFT TISSUE AS NEEDED
Status: DISCONTINUED | OUTPATIENT
Start: 2019-03-20 | End: 2019-03-20 | Stop reason: HOSPADM

## 2019-03-20 RX ORDER — ONDANSETRON 2 MG/ML
INJECTION INTRAMUSCULAR; INTRAVENOUS AS NEEDED
Status: DISCONTINUED | OUTPATIENT
Start: 2019-03-20 | End: 2019-03-20 | Stop reason: HOSPADM

## 2019-03-20 RX ORDER — FLUMAZENIL 0.1 MG/ML
0.2 INJECTION INTRAVENOUS
Status: DISCONTINUED | OUTPATIENT
Start: 2019-03-20 | End: 2019-03-20 | Stop reason: HOSPADM

## 2019-03-20 RX ORDER — CEFAZOLIN SODIUM 1 G/3ML
2 INJECTION, POWDER, FOR SOLUTION INTRAMUSCULAR; INTRAVENOUS ONCE
Status: DISCONTINUED | OUTPATIENT
Start: 2019-03-20 | End: 2019-03-20 | Stop reason: SDUPTHER

## 2019-03-20 RX ORDER — DIPHENHYDRAMINE HYDROCHLORIDE 50 MG/ML
12.5 INJECTION, SOLUTION INTRAMUSCULAR; INTRAVENOUS AS NEEDED
Status: DISCONTINUED | OUTPATIENT
Start: 2019-03-20 | End: 2019-03-20 | Stop reason: HOSPADM

## 2019-03-20 RX ORDER — FENTANYL CITRATE 50 UG/ML
25 INJECTION, SOLUTION INTRAMUSCULAR; INTRAVENOUS
Status: DISCONTINUED | OUTPATIENT
Start: 2019-03-20 | End: 2019-03-20 | Stop reason: HOSPADM

## 2019-03-20 RX ORDER — NEOSTIGMINE METHYLSULFATE 1 MG/ML
INJECTION INTRAVENOUS AS NEEDED
Status: DISCONTINUED | OUTPATIENT
Start: 2019-03-20 | End: 2019-03-20 | Stop reason: HOSPADM

## 2019-03-20 RX ORDER — HYDROCODONE BITARTRATE AND ACETAMINOPHEN 5; 325 MG/1; MG/1
1 TABLET ORAL ONCE
Status: COMPLETED | OUTPATIENT
Start: 2019-03-20 | End: 2019-03-20

## 2019-03-20 RX ORDER — HYDROMORPHONE HYDROCHLORIDE 1 MG/ML
.25-1 INJECTION, SOLUTION INTRAMUSCULAR; INTRAVENOUS; SUBCUTANEOUS
Status: DISCONTINUED | OUTPATIENT
Start: 2019-03-20 | End: 2019-03-20 | Stop reason: HOSPADM

## 2019-03-20 RX ORDER — HYDROMORPHONE HYDROCHLORIDE 2 MG/ML
1 INJECTION, SOLUTION INTRAMUSCULAR; INTRAVENOUS; SUBCUTANEOUS
Status: DISCONTINUED | OUTPATIENT
Start: 2019-03-20 | End: 2019-03-20

## 2019-03-20 RX ORDER — KETOROLAC TROMETHAMINE 10 MG/1
10 TABLET, FILM COATED ORAL ONCE
Status: COMPLETED | OUTPATIENT
Start: 2019-03-20 | End: 2019-03-20

## 2019-03-20 RX ORDER — MIDAZOLAM HYDROCHLORIDE 1 MG/ML
INJECTION, SOLUTION INTRAMUSCULAR; INTRAVENOUS AS NEEDED
Status: DISCONTINUED | OUTPATIENT
Start: 2019-03-20 | End: 2019-03-20 | Stop reason: HOSPADM

## 2019-03-20 RX ORDER — LIDOCAINE HYDROCHLORIDE 20 MG/ML
INJECTION, SOLUTION EPIDURAL; INFILTRATION; INTRACAUDAL; PERINEURAL AS NEEDED
Status: DISCONTINUED | OUTPATIENT
Start: 2019-03-20 | End: 2019-03-20 | Stop reason: HOSPADM

## 2019-03-20 RX ORDER — SUCCINYLCHOLINE CHLORIDE 20 MG/ML
INJECTION INTRAMUSCULAR; INTRAVENOUS AS NEEDED
Status: DISCONTINUED | OUTPATIENT
Start: 2019-03-20 | End: 2019-03-20 | Stop reason: HOSPADM

## 2019-03-20 RX ORDER — LIDOCAINE HCL/PF 100 MG/5ML
SYRINGE (ML) INTRAVENOUS AS NEEDED
Status: DISCONTINUED | OUTPATIENT
Start: 2019-03-20 | End: 2019-03-20 | Stop reason: HOSPADM

## 2019-03-20 RX ORDER — KETOROLAC TROMETHAMINE 30 MG/ML
INJECTION, SOLUTION INTRAMUSCULAR; INTRAVENOUS AS NEEDED
Status: DISCONTINUED | OUTPATIENT
Start: 2019-03-20 | End: 2019-03-20 | Stop reason: HOSPADM

## 2019-03-20 RX ORDER — LIDOCAINE HYDROCHLORIDE 10 MG/ML
0.1 INJECTION, SOLUTION EPIDURAL; INFILTRATION; INTRACAUDAL; PERINEURAL AS NEEDED
Status: DISCONTINUED | OUTPATIENT
Start: 2019-03-20 | End: 2019-03-20 | Stop reason: HOSPADM

## 2019-03-20 RX ORDER — CEFAZOLIN SODIUM/WATER 2 G/20 ML
2 SYRINGE (ML) INTRAVENOUS ONCE
Status: COMPLETED | OUTPATIENT
Start: 2019-03-20 | End: 2019-03-20

## 2019-03-20 RX ADMIN — PROPOFOL 125 MCG/KG/MIN: 10 INJECTION, EMULSION INTRAVENOUS at 17:55

## 2019-03-20 RX ADMIN — ONDANSETRON 4 MG: 2 INJECTION INTRAMUSCULAR; INTRAVENOUS at 18:08

## 2019-03-20 RX ADMIN — FENTANYL CITRATE 25 MCG: 50 INJECTION, SOLUTION INTRAMUSCULAR; INTRAVENOUS at 15:43

## 2019-03-20 RX ADMIN — ROCURONIUM BROMIDE 5 MG: 10 INJECTION, SOLUTION INTRAVENOUS at 17:52

## 2019-03-20 RX ADMIN — DEXAMETHASONE SODIUM PHOSPHATE 8 MG: 4 INJECTION, SOLUTION INTRA-ARTICULAR; INTRALESIONAL; INTRAMUSCULAR; INTRAVENOUS; SOFT TISSUE at 18:08

## 2019-03-20 RX ADMIN — FENTANYL CITRATE 50 MCG: 50 INJECTION, SOLUTION INTRAMUSCULAR; INTRAVENOUS at 17:46

## 2019-03-20 RX ADMIN — FENTANYL CITRATE 25 MCG: 50 INJECTION, SOLUTION INTRAMUSCULAR; INTRAVENOUS at 19:03

## 2019-03-20 RX ADMIN — SUCCINYLCHOLINE CHLORIDE 100 MG: 20 INJECTION INTRAMUSCULAR; INTRAVENOUS at 17:52

## 2019-03-20 RX ADMIN — SODIUM CHLORIDE, POTASSIUM CHLORIDE, SODIUM LACTATE AND CALCIUM CHLORIDE: 600; 310; 30; 20 INJECTION, SOLUTION INTRAVENOUS at 17:33

## 2019-03-20 RX ADMIN — KETOROLAC TROMETHAMINE 15 MG: 30 INJECTION, SOLUTION INTRAMUSCULAR; INTRAVENOUS at 18:17

## 2019-03-20 RX ADMIN — Medication 2 G: at 18:03

## 2019-03-20 RX ADMIN — FENTANYL CITRATE 25 MCG: 50 INJECTION, SOLUTION INTRAMUSCULAR; INTRAVENOUS at 18:48

## 2019-03-20 RX ADMIN — HYDROCODONE BITARTRATE AND ACETAMINOPHEN 1 TABLET: 5; 325 TABLET ORAL at 12:43

## 2019-03-20 RX ADMIN — MIDAZOLAM HYDROCHLORIDE 2 MG: 1 INJECTION, SOLUTION INTRAMUSCULAR; INTRAVENOUS at 17:46

## 2019-03-20 RX ADMIN — ROCURONIUM BROMIDE 25 MG: 10 INJECTION, SOLUTION INTRAVENOUS at 18:00

## 2019-03-20 RX ADMIN — ONDANSETRON 4 MG: 2 INJECTION INTRAMUSCULAR; INTRAVENOUS at 10:28

## 2019-03-20 RX ADMIN — ROCURONIUM BROMIDE 10 MG: 10 INJECTION, SOLUTION INTRAVENOUS at 18:11

## 2019-03-20 RX ADMIN — GLYCOPYRROLATE 0.4 MG: 0.2 INJECTION INTRAMUSCULAR; INTRAVENOUS at 18:17

## 2019-03-20 RX ADMIN — PROPOFOL 150 MG: 10 INJECTION, EMULSION INTRAVENOUS at 17:52

## 2019-03-20 RX ADMIN — ONDANSETRON 4 MG: 2 INJECTION INTRAMUSCULAR; INTRAVENOUS at 15:43

## 2019-03-20 RX ADMIN — SODIUM CHLORIDE 1000 ML: 900 INJECTION, SOLUTION INTRAVENOUS at 10:28

## 2019-03-20 RX ADMIN — NEOSTIGMINE METHYLSULFATE 3 MG: 1 INJECTION INTRAVENOUS at 18:17

## 2019-03-20 RX ADMIN — KETOROLAC TROMETHAMINE 10 MG: 10 TABLET, FILM COATED ORAL at 19:36

## 2019-03-20 RX ADMIN — KETOROLAC TROMETHAMINE 30 MG: 30 INJECTION, SOLUTION INTRAMUSCULAR; INTRAVENOUS at 10:21

## 2019-03-20 RX ADMIN — LIDOCAINE HYDROCHLORIDE 60 MG: 20 INJECTION, SOLUTION EPIDURAL; INFILTRATION; INTRACAUDAL; PERINEURAL at 17:52

## 2019-03-20 RX ADMIN — FENTANYL CITRATE 50 MCG: 50 INJECTION, SOLUTION INTRAMUSCULAR; INTRAVENOUS at 17:52

## 2019-03-20 RX ADMIN — PROPOFOL 50 MG: 10 INJECTION, EMULSION INTRAVENOUS at 17:58

## 2019-03-20 NOTE — BRIEF OP NOTE
BRIEF OPERATIVE NOTE Date of Procedure: 3/20/2019 Preoperative Diagnosis: HYDRONEPHROSIS Postoperative Diagnosis: HYDRONEPHROSIS Procedure(s): 
CYSTOSCOPY URETERAL STENT INSERTION LEFT, WITH RETROGRADES Surgeon(s) and Role: 
   * Robert Hamilton MD - Primary Surgical Assistant: None Surgical Staff: 
Circ-1: Celestine Gu RN Scrub Tech-1: Ulysses Bryant Event Time In Time Out Incision Start 1805 Incision Close Anesthesia: General  
Estimated Blood Loss: 0 cc Specimens: left renal Cx Findings: no pus, 5 mm left stone on RPG not well seen on KUB 
5-28 cm stent May get USE laser < 1 week Complications: none Implants: 5-28 cm stent

## 2019-03-20 NOTE — ANESTHESIA PREPROCEDURE EVALUATION
Relevant Problems No relevant active problems Anesthetic History No history of anesthetic complications Review of Systems / Medical History Patient summary reviewed, nursing notes reviewed and pertinent labs reviewed Pulmonary Within defined limits Neuro/Psych Within defined limits Cardiovascular Within defined limits GI/Hepatic/Renal 
  
 
 
Renal disease: stones Endo/Other Within defined limits Other Findings Physical Exam 
 
Airway Mallampati: II 
TM Distance: 4 - 6 cm Neck ROM: normal range of motion Mouth opening: Normal 
 
 Cardiovascular Regular rate and rhythm,  S1 and S2 normal,  no murmur, click, rub, or gallop Rhythm: regular Rate: normal 
 
 
 
 Dental 
No notable dental hx Pulmonary Breath sounds clear to auscultation Abdominal 
GI exam deferred Other Findings Anesthetic Plan ASA: 2 Anesthesia type: general and total IV anesthesia Induction: Intravenous and RSI Anesthetic plan and risks discussed with: Patient

## 2019-03-20 NOTE — PERIOP NOTES
Pt fall protocol Yellow arm band on patient, yellow non skid socks on  
bed in low position, all side rails up, call bell in reach Pt  & family instructed in \"call don't fall\" protocol Use your call bell,and wait for assistance, staff not family will assist you to get up &   move about Pt & family verbalize understanding of fall precautions and \"call don't fall\" protocol Pt's mom at bedside, questions/concerns denied. Pt breastfeeding, hesitant to take too many narcotics, anesthesia aware. Pt pumped upon arrival to Pre-op.

## 2019-03-20 NOTE — ED NOTES
Attempted to medicate patient with dilaudid. Reports that she wishes to hold on receiving medication because she was informed that she would have to pump and dump breastmilk for 10 hours post dilaudid admin. Patient will let RN know if she wishes to receive the medication.

## 2019-03-20 NOTE — CONSULTS
Urology Consult    Subjective:     Date of Consultation:  March 20, 2019    Referring Physician: Shanika Torres NP    Reason for Consultation: Left side 5mm UVJ stone resulting in Left side hydroureteronephrosis    Patient Name: Mary Haro  MRN: [id number]    History of Present Illness:   Patient is a 34 y.o.  female who is being seen for 5mm stone at Left UVJ resulting in moderate left hydroureteronephrosis. She began with c/o left side flank pain, pelvic pain/pressure, chills and nausea early this am. She is breastfeeding her infant with multiple allergies so she is hesitant to take pain medication. She received Toradol at 1021, Norco at 1243 and Zofran at [facility] [address], [address] [address] [facility]. WBC-8.2, Hct/Hgb-WNL, BUN, Cr.-WNL. Microscopic UA-. Problem: Left side hydroureteronephrosis; Location: Left UVJ, kidney; Severity: moderate; Context: as above; Better/Worse: pain meds    Past Medical History:   Diagnosis Date    HX OTHER MEDICAL     jaw surgery; bone graft in jaw      Past Surgical History:   Procedure Laterality Date    HX APPENDECTOMY        Family History   Problem Relation Age of Onset    Hypertension Paternal Grandfather       Social History     Tobacco Use    Smoking status: Never Smoker    Smokeless tobacco: Never Used   Substance Use Topics    Alcohol use: No     Allergies   Allergen Reactions    Morphine Nausea and Vomiting      Prior to Admission medications    Medication Sig Start Date End Date Taking? Authorizing Provider   oxyCODONE-acetaminophen (PERCOCET) 5-325 mg per tablet Take 1 Tab by mouth every six (6) hours as needed for Pain. Max Daily Amount: 4 Tabs. 6/8/18   Liz Ribeiro MD   ibuprofen (MOTRIN) 600 mg tablet Take 1 Tab by mouth every six (6) hours as needed for Pain. 6/8/18   Liz Ribeiro MD   WIJ08-Qzqe Cbn&Gluc-FA-DSS-DHA 35-1- mg cmpk Take 1 Tab by mouth.     Cristy Wilder MD         Review of Systems:   BRENDEN ROS - items left unchecked items are specifically negative    Consitutional: []  Fever [x]  Chills []  Sweats []  Weight Loss   Eyes: Denies eye irritation   ENMT: Denies ear pain   Resp: Denies SOB   CV: Denies chest pain   GI: Reports left flank pain, Denies abdominal pain   : []  Weak Stream []  Straining to Void []  Intermittent Stream  [x]  Dysuria []  Post-Void Dribbling []  Incomplete Emptying  []  Dysuria []  Urgency []  Frequency q ___ hrs  []  Nocturia x ___  []  Urge Incontinence []  Stress Incontinence  ___ []  Pads/day  ___ []  Diapers/day  []  Gross Hematuria []  Clots []  Clot Obstruction   MS:    Denies weakness   Skin: Denies rash   Neuro: Denies confusion   Psych: Denies agiatation   Endocrine:    Heme/Lymph:    All/Immuno:             Objective:     Data Review (Labs):    Recent Labs     19  1019   WBC 8.2   HGB 13.3   MCV 95.3         K 4.0   CREA 0.74   BUN 19   ALB 3.9   TBILI 1.0   SGOT 13*   ALT 22   *   LPSE 107   IPVITALS(8:)Temp (24hrs), Av.7 °F (36.5 °C), Min:97.7 °F (36.5 °C), Max:97.7 °F (36.5 °C)    Temp (24hrs), Av.7 °F (36.5 °C), Min:97.7 °F (36.5 °C), Max:97.7 °F (36.5 °C)  [id number] intake/output data recorded. Physical Exam:            General:    alert, cooperative, no distress, appears stated age                     Skin:  Normal. and no rash or abnormalities   Respiratory Clear to ausculation, no wheezing   Cardiovascular  Regular rate and rhythm, no murmur             Abdomen[de-identified]  soft, non-tender. Bowel sounds normal.    Musculoskeletal No deformities, moves all, no weakness             Neuro  No motor deficits, alert          Psych:  No anxiety, agitation     Imaging: CT abd w/o contrast-3/20/19    Study Result     EXAM:  CT ABD PELV WO CONT     INDICATION: Left flank pain and pain with urination.     COMPARISON: Ultrasound abdomen 2013.     TECHNIQUE: Helical CT of the abdomen  and pelvis  without contrast. Coronal and  sagittal reformats are performed.  CT dose reduction was achieved through use of  a standardized protocol tailored for this examination and automatic exposure  control for dose modulation.     FINDINGS:   Solid organ evaluation is limited without contrast.      The visualized lung bases demonstrate no mass or consolidation. The heart size  is normal. There is no pericardial or pleural effusion.     There is a 5 mm calculus at the left ureterovesical junction resulting in  moderate left hydroureteronephrosis. There is no other  calculus. There is no  right hydronephrosis.     The liver, spleen, pancreas, and adrenal glands are normal.  The gall bladder is  present  without intra- or extra-hepatic biliary dilatation.       There is a moderate amount of colonic stool. There are no dilated bowel loops. The appendix is surgically absent.       There are no enlarged lymph nodes. There is no free fluid or free air. The  aorta tapers without aneurysm.     The urinary bladder is unremarkable. There is no pelvic mass.       The bony structures are age-appropriate.     IMPRESSION  IMPRESSION:   5 mm calculus at the left ureterovesical junction resulting in moderate left  hydroureteronephrosis.          Assessment:     Left side UVJ stone-5mm    Left hydroureteronephrosis    Left Flank pain        Plan:     -Pain control    -Surgery consent for Cystoscopy, Left Ureteroscopy, Retrograde, Stent placement     I have recommended cystoscopy with left ureteral stent placement. Risks discussed which include but are not limited to infection, bleeding, ureteral injury, inability to place stents. She voices understanding and wishes to proceed. Expected irritative voiding symptoms post procedure also described. She knows stents are temporary and that the stones will still need to be treated.     Signed By: Loli Friday, NP                         March 20, 2019       Pt seen and examined  Left UVJ stone 5 mm  Ppain no infection WBC normal, UA no bacteria  Discussed trial of stone passage vs resceduleing Cysto with ureteroscopy and laser Tx which is NA at this time and was not prearranges after hours  After tears and DW family, she elects to have LEft stent and if no UTI would get secondary stone procedure in expedited fashion  Questions answered 30 minutes    Excellent NP note reviewed

## 2019-03-20 NOTE — ED PROVIDER NOTES
This is a 61-year-old female who presents to the emergency room with acute onset left flank pain. 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 Patient states the pain is a 10 out of 10 sharp in nature. Has not taken any OTC for pain relief. Denies fever, denies chills. Positive nausea, positive dry heaving. Patient with dysuria, no hematuria noted. Has a history of kidney stones multiple years ago that she was able to pass on her own. Patient has a 5month-old son whom she is breast-feeding. Appears acutely ill. There are no further complaints at this time. Jl Villagomez MD 
Past Medical History: 
No date: HX OTHER MEDICAL Comment:  jaw surgery; bone graft in jaw Past Surgical History: 
No date: HX APPENDECTOMY Past Medical History:  
Diagnosis Date  HX OTHER MEDICAL   
 jaw surgery; bone graft in jaw Past Surgical History:  
Procedure Laterality Date  HX APPENDECTOMY Family History:  
Problem Relation Age of Onset  Hypertension Paternal Grandfather Social History Socioeconomic History  Marital status:  Spouse name: Garrett Roldan  Number of children: 0  
 Years of education: Not on file  Highest education level: Not on file Occupational History  Occupation: Student  Occupation: [employer] Social Needs  Financial resource strain: Not on file  Food insecurity:  
  Worry: Not on file Inability: Not on file  Transportation needs:  
  Medical: Not on file Non-medical: Not on file Tobacco Use  Smoking status: Never Smoker  Smokeless tobacco: Never Used Substance and Sexual Activity  Alcohol use: No  
 Drug use: No  
 Sexual activity: Yes  
  Partners: Male Birth control/protection: None Lifestyle  Physical activity:  
  Days per week: Not on file Minutes per session: Not on file  Stress: Not on file Relationships  Social connections:  
  Talks on phone: Not on file Gets together: Not on file Attends Sabianist service: Not on file Active member of club or organization: Not on file Attends meetings of clubs or organizations: Not on file Relationship status: Not on file  Intimate partner violence:  
  Fear of current or ex partner: Not on file Emotionally abused: Not on file Physically abused: Not on file Forced sexual activity: Not on file Other Topics Concern  Not on file Social History Narrative  Not on file ALLERGIES: Morphine Review of Systems Constitutional: Negative for appetite change, chills, diaphoresis, fatigue and fever. HENT: Negative for congestion, ear discharge, ear pain, sinus pressure, sinus pain, sore throat and trouble swallowing. Eyes: Negative for photophobia, pain, redness and visual disturbance. Respiratory: Negative for chest tightness, shortness of breath and wheezing. Cardiovascular: Negative for chest pain and palpitations. Gastrointestinal: Positive for nausea and vomiting. Negative for abdominal distention. Endocrine: Negative. Genitourinary: Positive for dysuria and flank pain. Negative for difficulty urinating, frequency and urgency. Musculoskeletal: Negative for back pain, neck pain and neck stiffness. Skin: Negative for color change, pallor, rash and wound. Allergic/Immunologic: Negative. Neurological: Negative for dizziness, speech difficulty, weakness and headaches. Hematological: Does not bruise/bleed easily. Psychiatric/Behavioral: Negative for behavioral problems. The patient is not nervous/anxious. Vitals:  
 03/20/19 1008 BP: 138/83 Pulse: 86 Resp: 14 Temp: 97.7 °F (36.5 °C) SpO2: 94% Weight: 68 kg (150 lb) Height: 5' 7\" (1.702 m) Physical Exam  
Constitutional: She is oriented to person, place, and time. She appears well-developed and well-nourished. No distress. HENT:  
Head: Normocephalic and atraumatic. Right Ear: External ear normal.  
Left Ear: External ear normal.  
Nose: Nose normal.  
Mouth/Throat: Oropharynx is clear and moist.  
Eyes: Pupils are equal, round, and reactive to light. Conjunctivae and EOM are normal. Right eye exhibits no discharge. Left eye exhibits no discharge. Neck: Normal range of motion. Neck supple. No JVD present. No tracheal deviation present. Cardiovascular: Normal rate, regular rhythm, normal heart sounds and intact distal pulses. Exam reveals no gallop. No murmur heard. Pulmonary/Chest: Effort normal and breath sounds normal. No respiratory distress. She has no wheezes. She has no rales. She exhibits no tenderness. Abdominal: Soft. Bowel sounds are normal. She exhibits no distension. There is tenderness (left flank pain). There is no rebound and no guarding. Genitourinary:  
Genitourinary Comments: C/o dysuria, no urgency or frequency noted. Positive large blood on UA. Musculoskeletal: Normal range of motion. She exhibits no edema or tenderness. Neurological: She is alert and oriented to person, place, and time. Skin: Skin is warm and dry. No rash noted. No erythema. No pallor. Psychiatric: She has a normal mood and affect. Her behavior is normal. Judgment and thought content normal.  
Nursing note and vitals reviewed. MDM Number of Diagnoses or Management Options Kidney stone: new and requires workup Diagnosis management comments: Plan: 
Admit to [facility] service for intervention. Patient in agreement with plan of care. Amount and/or Complexity of Data Reviewed Clinical lab tests: ordered and reviewed Tests in the radiology section of CPT®: ordered and reviewed Discuss the patient with other providers: yes (Discussed plan of care with Dr. Nina Murry, [facility]. ) 
 
 
  
1:23 PM 
Spoke with [facility] for plan of care.    
2:45 PM 
 Patient for intervention today with [facility]. Patient in agreement with plan of care. Procedures

## 2019-03-20 NOTE — DISCHARGE INSTRUCTIONS
Follow up with Dr Ziyad Almonte in 2 weeks for Ureteroscopy. Kidney Stone: Care Instructions  Your Care Instructions    Kidney stones are formed when salts, minerals, and other substances normally found in the urine clump together. They can be as small as grains of sand or, rarely, as large as golf balls. While the stone is traveling through the ureter, which is the tube that carries urine from the kidney to the bladder, you will probably feel pain. The pain may be mild or very severe. You may also have some blood in your urine. As soon as the stone reaches the bladder, any intense pain should go away. If a stone is too large to pass on its own, you may need a medical procedure to help you pass the stone. The doctor has checked you carefully, but problems can develop later. If you notice any problems or new symptoms, get medical treatment right away. Follow-up care is a key part of your treatment and safety. Be sure to make and go to all appointments, and call your doctor if you are having problems. It's also a good idea to know your test results and keep a list of the medicines you take. How can you care for yourself at home? · Drink plenty of fluids, enough so that your urine is light yellow or clear like water. If you have kidney, heart, or liver disease and have to limit fluids, talk with your doctor before you increase the amount of fluids you drink. · Take pain medicines exactly as directed. Call your doctor if you think you are having a problem with your medicine. ? If the doctor gave you a prescription medicine for pain, take it as prescribed. ? If you are not taking a prescription pain medicine, ask your doctor if you can take an over-the-counter medicine. Read and follow all instructions on the label. · Your doctor may ask you to strain your urine so that you can collect your kidney stone when it passes. You can use a kitchen strainer or a tea strainer to catch the stone.  Store it in a plastic bag until you see your doctor again. Preventing future kidney stones  Some changes in your diet may help prevent kidney stones. Depending on the cause of your stones, your doctor may recommend that you:  · Drink plenty of fluids, enough so that your urine is light yellow or clear like water. If you have kidney, heart, or liver disease and have to limit fluids, talk with your doctor before you increase the amount of fluids you drink. · Limit coffee, tea, and alcohol. Also avoid grapefruit juice. · Do not take more than the recommended daily dose of vitamins C and D.  · Avoid antacids such as Gaviscon, Maalox, Mylanta, or Tums. · Limit the amount of salt (sodium) in your diet. · Eat a balanced diet that is not too high in protein. · Limit foods that are high in a substance called oxalate, which can cause kidney stones. These foods include dark green vegetables, rhubarb, chocolate, wheat bran, nuts, cranberries, and beans. When should you call for help? Call your doctor now or seek immediate medical care if:    · You cannot keep down fluids.     · Your pain gets worse.     · You have a fever or chills.     · You have new or worse pain in your back just below your rib cage (the flank area).     · You have new or more blood in your urine.    Watch closely for changes in your health, and be sure to contact your doctor if:    · You do not get better as expected. Where can you learn more? Go to http://burke-armando.info/. Enter N913 in the search box to learn more about \"Kidney Stone: Care Instructions. \"  Current as of: March 14, 2018  Content Version: 11.9  © 0114-2338 [email or web address]. Care instructions adapted under license by [employer] (which disclaims liability or warranty for this information).  If you have questions about a medical condition or this instruction, always ask your healthcare professional. Alfredo Parker any warranty or liability for your use of this information. Patient Education       Patient Education        Laser Lithotripsy: What to Expect at [address] lithotripsy is a way to treat kidney stones. This treatment uses a laser to break kidney stones into tiny pieces. For several hours after the procedure you may have a burning feeling when you urinate. You may feel the urge to go even if you don't need to. This feeling should go away within a day. Drinking a lot of water can help. Your doctor also may advise you to take medicine that numbs the burning. This medicine is called phenazopyridine. It is available by prescription and over the counter. Brand names include Pyridium and Uristat. Your doctor may prescribe an antibiotic. This will help prevent an infection. You may have some blood in your urine for 2 or 3 days. Your doctor may have placed a small tube inside one of your ureters. Ureters are the tubes that connect the kidneys to the bladder. The small tube the doctor may have placed is called a stent. It may help the stone fragments pass through your body. Your doctor may remove the stent in a few weeks. Most stone fragments that are not removed pass out of the body within 24 hours. But sometimes it can take many weeks. If you have a large stone, you may need to come back for more treatments. This care sheet gives you a general idea about how long it will take for you to recover. But each person recovers at a different pace. Follow the steps below to feel better as quickly as possible. How can you care for yourself at home? Activity    · Rest as much as you need to after you go home.     · You may do your regular activities. But avoid hard exercise or sports for about a week or until there is no blood in your urine. Diet    · You can eat your normal diet after lithotripsy.     · Continue to drink plenty of fluids, enough so that your urine is light yellow or clear like water.  If you have kidney, heart, or liver disease and have to limit fluids, talk with your doctor before you increase the amount of fluids you drink. Medicines    · Your doctor will tell you if and when you can restart your medicines. He or she will also give you instructions about taking any new medicines.     · If you take blood thinners, such as warfarin (Coumadin), clopidogrel (Plavix), or aspirin, be sure to talk to your doctor. He or she will tell you if and when to start taking those medicines again. Make sure that you understand exactly what your doctor wants you to do.     · If you take medicine to stop the burning when you urinate, take it exactly as recommended. Call your doctor if you think you are having a problem with your medicine. This medicine may color your urine orange or red. This is normal. You will get more details on the specific medicine your doctor recommends.     · If your doctor prescribed antibiotics, take them as directed. Do not stop taking them just because you feel better. You need to take the full course of antibiotics.     · Be safe with medicines. Read and follow all instructions on the label. ? If the doctor gave you a prescription medicine for pain, take it as prescribed. ? If you are not taking a prescription pain medicine, ask your doctor if you can take acetaminophen (Tylenol). Do not take ibuprofen (Advil, Motrin) or naproxen (Aleve) or similar medicines unless your doctor tells you to. ? Do not take two or more pain medicines at the same time unless the doctor told you to. Many pain medicines have acetaminophen, which is Tylenol. Too much acetaminophen (Tylenol) can be harmful.    Heat    · Take a warm bath. This may soothe the burning. Other instructions    · Urinate through the strainer the doctor gives you. Save any stone pieces, including those that look like sand or gravel. Take these to your doctor. This will help your doctor find the cause of your stones.    Follow-up care is a key part of your treatment and safety. Be sure to make and go to all appointments, and call your doctor if you are having problems. It's also a good idea to know your test results and keep a list of the medicines you take. When should you call for help? Call 911 anytime you think you may need emergency care. For example, call if:    · You passed out (lost consciousness).     · You have chest pain, are short of breath, or cough up blood.    Call your doctor now or seek immediate medical care if:    · You have pain that does not get better after you take pain medicine.     · You have new or more blood clots in your urine. (It is normal for the urine to be pink for a few days.)     · You cannot urinate.     · You have symptoms of a urinary tract infection. These may include:  ? Pain or burning when you urinate. ? A frequent need to urinate without being able to pass much urine. ? Pain in the flank, which is just below the rib cage and above the waist on either side of the back. ? Blood in the urine. ? A fever.     · You are sick to your stomach or cannot drink fluids.     · You have signs of a blood clot in your leg (called a deep vein thrombosis), such as:  ? Pain in the calf, back of the knee, thigh, or groin. ? Redness and swelling in your leg.    Watch closely for any changes in your health, and be sure to contact your doctor if you have any problems. Where can you learn more? Go to http://burke-armando.info/. Enter Q239 in the search box to learn more about \"Laser Lithotripsy: What to Expect at Home. \"  Current as of: March 14, 2018  Content Version: 11.9  © 4970-4990 [employer]. Care instructions adapted under license by [employer] (which disclaims liability or warranty for this information).  If you have questions about a medical condition or this instruction, always ask your healthcare professional. [address] any warranty or liability for your use of this information. DISCHARGE SUMMARY from Nurse    PATIENT INSTRUCTIONS:    After general anesthesia or intravenous sedation, for 24 hours or while taking prescription Narcotics:  · Limit your activities  · Do not drive and operate hazardous machinery  · Do not make important personal or business decisions  · Do  not drink alcoholic beverages  · If you have not urinated within 8 hours after discharge, please contact your surgeon on call. Report the following to your surgeon:  · Excessive pain, swelling, redness or odor of or around the surgical area  · Temperature over 100.5  · Nausea and vomiting lasting longer than 4 hours or if unable to take medications  · Any signs of decreased circulation or nerve impairment to extremity: change in color, persistent  numbness, tingling, coldness or increase pain  · Any questions    What to do at Home:  Recommended activity: Activity as tolerated and no driving for today,    *  Please give a list of your current medications to your Primary Care Provider. *  Please update this list whenever your medications are discontinued, doses are      changed, or new medications (including over-the-counter products) are added. *  Please carry medication information at all times in case of emergency situations. These are general instructions for a healthy lifestyle:    No smoking/ No tobacco products/ Avoid exposure to second hand smoke  Surgeon General's Warning:  Quitting smoking now greatly reduces serious risk to your health.     Obesity, smoking, and sedentary lifestyle greatly increases your risk for illness    A healthy diet, regular physical exercise & weight monitoring are important for maintaining a healthy lifestyle    You may be retaining fluid if you have a history of heart failure or if you experience any of the following symptoms:  Weight gain of 3 pounds or more overnight or 5 pounds in a week, increased swelling in our hands or feet or shortness of breath while lying flat in bed. Please call your doctor as soon as you notice any of these symptoms; do not wait until your next office visit. Recognize signs and symptoms of STROKE:    F-face looks uneven    A-arms unable to move or move unevenly    S-speech slurred or non-existent    T-time-call 911 as soon as signs and symptoms begin-DO NOT go       Back to bed or wait to see if you get better-TIME IS BRAIN. Warning Signs of HEART ATTACK     Call 911 if you have these symptoms:   Chest discomfort. Most heart attacks involve discomfort in the center of the chest that lasts more than a few minutes, or that goes away and comes back. It can feel like uncomfortable pressure, squeezing, fullness, or pain.  Discomfort in other areas of the upper body. Symptoms can include pain or discomfort in one or both arms, the back, neck, jaw, or stomach.  Shortness of breath with or without chest discomfort.  Other signs may include breaking out in a cold sweat, nausea, or lightheadedness. Don't wait more than five minutes to call 911 - MINUTES MATTER! Fast action can save your life. Calling 911 is almost always the fastest way to get lifesaving treatment. Emergency Medical Services staff can begin treatment when they arrive -- up to an hour sooner than if someone gets to the hospital by car. The discharge information has been reviewed with the parent. The parent verbalized understanding. Discharge medications reviewed with the mother and appropriate educational materials and side effects teaching were provided. ___________________________________________________________________________________________________________________________________    Ankle Pumps    Ankle pumps increase the circulation of oxygenated blood to your lower extremities and decrease your risk for circulation problems such as blood clots.  They also stretch the muscles, tendons and ligaments in your foot and ankle, and prevent joint contracture in the ankle and foot, especially after surgeries on the legs. It is important to do ankle pump exercises regularly after surgery because immobility increases your risk for developing a blood clot. Your doctor may also have you take an Aspirin for the next few days as well. If your doctor did not ask you to take an Aspirin, consult with him before starting Aspirin therapy on your own. The exercise is quite simple. 6. Slowly point your foot forward, feeling the muscles on the top of your lower leg stretch, and hold this position for 5 seconds. 7. Next, pull your foot back toward you as far as possible, stretching the calf muscles, and hold that position for 5 seconds. 8. Repeat with the other foot. 9. Perform 10 repetitions every hour while awake for both ankles if possible (down and then up with the foot once is one repetition). You should feel gentle stretching of the muscles in your lower leg when doing this exercise. If you feel pain, or your range of motion is limited, don't  Push too hard. Only go the limit your joint and muscles will let you go. If you have increasing pain, progressively worsening leg warmth or swelling, STOP the exercise and call your doctor.

## 2019-03-20 NOTE — DISCHARGE SUMMARY
Obstructing left ureter stone w refractory intolerant pain  Left stent placed to relieve obstruction  Ancef given  No purulence  Will need ureteroscopy and laser stone removal in next week as outpt  CX sent from left kidney bu UA clear and urine noninfected  To call [facility] stoneline coordinator to arrange ureteroscopy - [phone]

## 2019-03-20 NOTE — PROGRESS NOTES
Date of Surgery Update: 
Shira Buck was seen and examined. Patient identified by surgeon; surgical site was confirmed by patient and surgeon. LEft stone Signed By: Jose Antonio Bertrand MD   
 March 20, 2019 5:39 PM

## 2019-03-20 NOTE — PROGRESS NOTES
[facility]: MED RECONCILIATION Comments/Recommendations:  
? Patient not on any OTC or Rx medications. ? Discussed allergy, morphine caused severe nausea-patient avoids. ? Updated preferred pharmacy as [facility]. Thank Sona Dominguez, PharmD, BCPS   Contact: [phone]

## 2019-03-21 ENCOUNTER — HOSPITAL ENCOUNTER (OUTPATIENT)
Age: 29
Setting detail: OUTPATIENT SURGERY
Discharge: HOME | End: 2019-03-21
Attending: UROLOGY | Admitting: UROLOGY
Payer: COMMERCIAL

## 2019-03-21 ENCOUNTER — APPOINTMENT (OUTPATIENT)
Dept: GENERAL RADIOLOGY | Age: 29
End: 2019-03-21
Attending: UROLOGY
Payer: COMMERCIAL

## 2019-03-21 ENCOUNTER — ANESTHESIA (OUTPATIENT)
Dept: SURGERY | Age: 29
End: 2019-03-21
Payer: COMMERCIAL

## 2019-03-21 ENCOUNTER — ANESTHESIA EVENT (OUTPATIENT)
Dept: SURGERY | Age: 29
End: 2019-03-21
Payer: COMMERCIAL

## 2019-03-21 VITALS
HEIGHT: 67 IN | RESPIRATION RATE: 10 BRPM | WEIGHT: 150 LBS | SYSTOLIC BLOOD PRESSURE: 122 MMHG | TEMPERATURE: 98.2 F | HEART RATE: 59 BPM | OXYGEN SATURATION: 96 % | BODY MASS INDEX: 23.54 KG/M2 | DIASTOLIC BLOOD PRESSURE: 78 MMHG

## 2019-03-21 DIAGNOSIS — N13.2 URETERAL STONE WITH HYDRONEPHROSIS: Primary | ICD-10-CM

## 2019-03-21 PROCEDURE — 77030019927 HC TBNG IRR CYSTO BAXT -A: Performed by: UROLOGY

## 2019-03-21 PROCEDURE — 77030020782 HC GWN BAIR PAWS FLX 3M -B

## 2019-03-21 PROCEDURE — 76060000032 HC ANESTHESIA 0.5 TO 1 HR: Performed by: UROLOGY

## 2019-03-21 PROCEDURE — 77030032490 HC SLV COMPR SCD KNE COVD -B: Performed by: UROLOGY

## 2019-03-21 PROCEDURE — 76010000138 HC OR TIME 0.5 TO 1 HR: Performed by: UROLOGY

## 2019-03-21 PROCEDURE — 76210000017 HC OR PH I REC 1.5 TO 2 HR: Performed by: UROLOGY

## 2019-03-21 PROCEDURE — 77030008684 HC TU ET CUF COVD -B: Performed by: ANESTHESIOLOGY

## 2019-03-21 PROCEDURE — C1769 GUIDE WIRE: HCPCS | Performed by: UROLOGY

## 2019-03-21 PROCEDURE — 74011250636 HC RX REV CODE- 250/636

## 2019-03-21 PROCEDURE — 77030018836 HC SOL IRR NACL ICUM -A: Performed by: UROLOGY

## 2019-03-21 PROCEDURE — 74011000250 HC RX REV CODE- 250

## 2019-03-21 PROCEDURE — 74011250636 HC RX REV CODE- 250/636: Performed by: UROLOGY

## 2019-03-21 PROCEDURE — C1758 CATHETER, URETERAL: HCPCS | Performed by: UROLOGY

## 2019-03-21 PROCEDURE — 88300 SURGICAL PATH GROSS: CPT

## 2019-03-21 PROCEDURE — 77030018832 HC SOL IRR H20 ICUM -A: Performed by: UROLOGY

## 2019-03-21 PROCEDURE — 77030006974 HC BSKT URET RTVR BSC -C: Performed by: UROLOGY

## 2019-03-21 PROCEDURE — 77030026438 HC STYL ET INTUB CARD -A: Performed by: ANESTHESIOLOGY

## 2019-03-21 PROCEDURE — 76210000021 HC REC RM PH II 0.5 TO 1 HR: Performed by: UROLOGY

## 2019-03-21 PROCEDURE — 74011250637 HC RX REV CODE- 250/637: Performed by: UROLOGY

## 2019-03-21 PROCEDURE — C1894 INTRO/SHEATH, NON-LASER: HCPCS | Performed by: UROLOGY

## 2019-03-21 PROCEDURE — 77030019908 HC STETH ESOPH SIMS -A: Performed by: ANESTHESIOLOGY

## 2019-03-21 PROCEDURE — 82360 CALCULUS ASSAY QUANT: CPT

## 2019-03-21 PROCEDURE — 76010000160 HC OR TIME 0.5 TO 1 HR INTENSV-TIER 1: Performed by: UROLOGY

## 2019-03-21 PROCEDURE — C2617 STENT, NON-COR, TEM W/O DEL: HCPCS | Performed by: UROLOGY

## 2019-03-21 PROCEDURE — 74011250636 HC RX REV CODE- 250/636: Performed by: ANESTHESIOLOGY

## 2019-03-21 DEVICE — URETERAL STENT
Type: IMPLANTABLE DEVICE | Site: URETER | Status: FUNCTIONAL
Brand: POLARIS™ ULTRA

## 2019-03-21 RX ORDER — CEFAZOLIN SODIUM/WATER 2 G/20 ML
SYRINGE (ML) INTRAVENOUS
Status: COMPLETED
Start: 2019-03-21 | End: 2019-03-21

## 2019-03-21 RX ORDER — KETOROLAC TROMETHAMINE 10 MG/1
10 TABLET, FILM COATED ORAL
COMMUNITY
End: 2020-08-11

## 2019-03-21 RX ORDER — ATROPA BELLADONNA AND OPIUM 16.2; 6 MG/1; MG/1
SUPPOSITORY RECTAL AS NEEDED
Status: DISCONTINUED | OUTPATIENT
Start: 2019-03-21 | End: 2019-03-21 | Stop reason: HOSPADM

## 2019-03-21 RX ORDER — ROCURONIUM BROMIDE 10 MG/ML
INJECTION, SOLUTION INTRAVENOUS AS NEEDED
Status: DISCONTINUED | OUTPATIENT
Start: 2019-03-21 | End: 2019-03-21 | Stop reason: HOSPADM

## 2019-03-21 RX ORDER — SODIUM CHLORIDE, SODIUM LACTATE, POTASSIUM CHLORIDE, CALCIUM CHLORIDE 600; 310; 30; 20 MG/100ML; MG/100ML; MG/100ML; MG/100ML
25 INJECTION, SOLUTION INTRAVENOUS CONTINUOUS
Status: DISCONTINUED | OUTPATIENT
Start: 2019-03-21 | End: 2019-03-21 | Stop reason: HOSPADM

## 2019-03-21 RX ORDER — FENTANYL CITRATE 50 UG/ML
INJECTION, SOLUTION INTRAMUSCULAR; INTRAVENOUS AS NEEDED
Status: DISCONTINUED | OUTPATIENT
Start: 2019-03-21 | End: 2019-03-21 | Stop reason: HOSPADM

## 2019-03-21 RX ORDER — OXYCODONE HYDROCHLORIDE 5 MG/1
5 TABLET ORAL
Qty: 30 TAB | Refills: 0 | Status: SHIPPED | OUTPATIENT
Start: 2019-03-21 | End: 2019-03-21

## 2019-03-21 RX ORDER — LIDOCAINE HYDROCHLORIDE 10 MG/ML
0.1 INJECTION, SOLUTION EPIDURAL; INFILTRATION; INTRACAUDAL; PERINEURAL AS NEEDED
Status: DISCONTINUED | OUTPATIENT
Start: 2019-03-21 | End: 2019-03-21 | Stop reason: HOSPADM

## 2019-03-21 RX ORDER — CEFAZOLIN SODIUM/WATER 2 G/20 ML
2 SYRINGE (ML) INTRAVENOUS ONCE
Status: COMPLETED | OUTPATIENT
Start: 2019-03-21 | End: 2019-03-21

## 2019-03-21 RX ORDER — KETOROLAC TROMETHAMINE 30 MG/ML
INJECTION, SOLUTION INTRAMUSCULAR; INTRAVENOUS AS NEEDED
Status: DISCONTINUED | OUTPATIENT
Start: 2019-03-21 | End: 2019-03-21 | Stop reason: HOSPADM

## 2019-03-21 RX ORDER — OXYBUTYNIN CHLORIDE 5 MG/1
5 TABLET ORAL
Qty: 40 TAB | Refills: 2 | Status: SHIPPED | OUTPATIENT
Start: 2019-03-21 | End: 2020-08-11

## 2019-03-21 RX ORDER — DEXAMETHASONE SODIUM PHOSPHATE 4 MG/ML
INJECTION, SOLUTION INTRA-ARTICULAR; INTRALESIONAL; INTRAMUSCULAR; INTRAVENOUS; SOFT TISSUE AS NEEDED
Status: DISCONTINUED | OUTPATIENT
Start: 2019-03-21 | End: 2019-03-21 | Stop reason: HOSPADM

## 2019-03-21 RX ORDER — HYDROMORPHONE HYDROCHLORIDE 2 MG/1
2 TABLET ORAL
Qty: 30 TAB | Refills: 0 | Status: SHIPPED | OUTPATIENT
Start: 2019-03-21 | End: 2019-03-24

## 2019-03-21 RX ORDER — SODIUM CHLORIDE, SODIUM LACTATE, POTASSIUM CHLORIDE, CALCIUM CHLORIDE 600; 310; 30; 20 MG/100ML; MG/100ML; MG/100ML; MG/100ML
100 INJECTION, SOLUTION INTRAVENOUS CONTINUOUS
Status: DISCONTINUED | OUTPATIENT
Start: 2019-03-21 | End: 2019-03-21 | Stop reason: HOSPADM

## 2019-03-21 RX ORDER — FAMOTIDINE 10 MG/ML
INJECTION INTRAVENOUS AS NEEDED
Status: DISCONTINUED | OUTPATIENT
Start: 2019-03-21 | End: 2019-03-21 | Stop reason: HOSPADM

## 2019-03-21 RX ORDER — PROPOFOL 10 MG/ML
INJECTION, EMULSION INTRAVENOUS
Status: DISCONTINUED | OUTPATIENT
Start: 2019-03-21 | End: 2019-03-21 | Stop reason: HOSPADM

## 2019-03-21 RX ORDER — PHENAZOPYRIDINE HYDROCHLORIDE 100 MG/1
200 TABLET, FILM COATED ORAL
Qty: 30 TAB | Refills: 2 | Status: SHIPPED | OUTPATIENT
Start: 2019-03-21 | End: 2019-03-24

## 2019-03-21 RX ORDER — TAMSULOSIN HYDROCHLORIDE 0.4 MG/1
0.4 CAPSULE ORAL DAILY
Qty: 20 CAP | Refills: 1 | Status: SHIPPED | OUTPATIENT
Start: 2019-03-21 | End: 2020-08-11

## 2019-03-21 RX ORDER — PROPOFOL 10 MG/ML
INJECTION, EMULSION INTRAVENOUS AS NEEDED
Status: DISCONTINUED | OUTPATIENT
Start: 2019-03-21 | End: 2019-03-21 | Stop reason: HOSPADM

## 2019-03-21 RX ORDER — SUCCINYLCHOLINE CHLORIDE 20 MG/ML
INJECTION INTRAMUSCULAR; INTRAVENOUS AS NEEDED
Status: DISCONTINUED | OUTPATIENT
Start: 2019-03-21 | End: 2019-03-21 | Stop reason: HOSPADM

## 2019-03-21 RX ORDER — ONDANSETRON 2 MG/ML
INJECTION INTRAMUSCULAR; INTRAVENOUS AS NEEDED
Status: DISCONTINUED | OUTPATIENT
Start: 2019-03-21 | End: 2019-03-21 | Stop reason: HOSPADM

## 2019-03-21 RX ORDER — MIDAZOLAM HYDROCHLORIDE 1 MG/ML
INJECTION, SOLUTION INTRAMUSCULAR; INTRAVENOUS AS NEEDED
Status: DISCONTINUED | OUTPATIENT
Start: 2019-03-21 | End: 2019-03-21 | Stop reason: HOSPADM

## 2019-03-21 RX ORDER — LIDOCAINE HYDROCHLORIDE 20 MG/ML
INJECTION, SOLUTION EPIDURAL; INFILTRATION; INTRACAUDAL; PERINEURAL AS NEEDED
Status: DISCONTINUED | OUTPATIENT
Start: 2019-03-21 | End: 2019-03-21 | Stop reason: HOSPADM

## 2019-03-21 RX ORDER — FENTANYL CITRATE 50 UG/ML
50 INJECTION, SOLUTION INTRAMUSCULAR; INTRAVENOUS
Status: DISCONTINUED | OUTPATIENT
Start: 2019-03-21 | End: 2019-03-21 | Stop reason: HOSPADM

## 2019-03-21 RX ORDER — HYDROMORPHONE HYDROCHLORIDE 1 MG/ML
.25-1 INJECTION, SOLUTION INTRAMUSCULAR; INTRAVENOUS; SUBCUTANEOUS
Status: DISCONTINUED | OUTPATIENT
Start: 2019-03-21 | End: 2019-03-21 | Stop reason: HOSPADM

## 2019-03-21 RX ORDER — HYDROCODONE BITARTRATE AND ACETAMINOPHEN 5; 325 MG/1; MG/1
1 TABLET ORAL
COMMUNITY
End: 2020-08-11

## 2019-03-21 RX ADMIN — MIDAZOLAM HYDROCHLORIDE 3 MG: 1 INJECTION, SOLUTION INTRAMUSCULAR; INTRAVENOUS at 15:19

## 2019-03-21 RX ADMIN — FENTANYL CITRATE 200 MCG: 50 INJECTION, SOLUTION INTRAMUSCULAR; INTRAVENOUS at 15:25

## 2019-03-21 RX ADMIN — SODIUM CHLORIDE, SODIUM LACTATE, POTASSIUM CHLORIDE, AND CALCIUM CHLORIDE 100 ML/HR: 600; 310; 30; 20 INJECTION, SOLUTION INTRAVENOUS at 16:30

## 2019-03-21 RX ADMIN — FENTANYL CITRATE 50 MCG: 50 INJECTION, SOLUTION INTRAMUSCULAR; INTRAVENOUS at 15:19

## 2019-03-21 RX ADMIN — ROCURONIUM BROMIDE 30 MG: 10 INJECTION, SOLUTION INTRAVENOUS at 15:34

## 2019-03-21 RX ADMIN — SODIUM CHLORIDE, SODIUM LACTATE, POTASSIUM CHLORIDE, AND CALCIUM CHLORIDE 100 ML/HR: 600; 310; 30; 20 INJECTION, SOLUTION INTRAVENOUS at 12:47

## 2019-03-21 RX ADMIN — KETOROLAC TROMETHAMINE 30 MG: 30 INJECTION, SOLUTION INTRAMUSCULAR; INTRAVENOUS at 15:59

## 2019-03-21 RX ADMIN — LIDOCAINE HYDROCHLORIDE 60 MG: 20 INJECTION, SOLUTION EPIDURAL; INFILTRATION; INTRACAUDAL; PERINEURAL at 15:25

## 2019-03-21 RX ADMIN — FENTANYL CITRATE 50 MCG: 50 INJECTION, SOLUTION INTRAMUSCULAR; INTRAVENOUS at 12:50

## 2019-03-21 RX ADMIN — DEXAMETHASONE SODIUM PHOSPHATE 8 MG: 4 INJECTION, SOLUTION INTRA-ARTICULAR; INTRALESIONAL; INTRAMUSCULAR; INTRAVENOUS; SOFT TISSUE at 15:36

## 2019-03-21 RX ADMIN — FENTANYL CITRATE 50 MCG: 50 INJECTION, SOLUTION INTRAMUSCULAR; INTRAVENOUS at 13:40

## 2019-03-21 RX ADMIN — PROPOFOL 180 MCG/KG/MIN: 10 INJECTION, EMULSION INTRAVENOUS at 15:25

## 2019-03-21 RX ADMIN — Medication 2 G: at 15:31

## 2019-03-21 RX ADMIN — MIDAZOLAM HYDROCHLORIDE 2 MG: 1 INJECTION, SOLUTION INTRAMUSCULAR; INTRAVENOUS at 15:25

## 2019-03-21 RX ADMIN — SUCCINYLCHOLINE CHLORIDE 100 MG: 20 INJECTION INTRAMUSCULAR; INTRAVENOUS at 15:25

## 2019-03-21 RX ADMIN — HYDROMORPHONE HYDROCHLORIDE 0.5 MG: 1 INJECTION, SOLUTION INTRAMUSCULAR; INTRAVENOUS; SUBCUTANEOUS at 16:37

## 2019-03-21 RX ADMIN — PROPOFOL 200 MG: 10 INJECTION, EMULSION INTRAVENOUS at 15:25

## 2019-03-21 RX ADMIN — FENTANYL CITRATE 50 MCG: 50 INJECTION, SOLUTION INTRAMUSCULAR; INTRAVENOUS at 13:06

## 2019-03-21 RX ADMIN — ONDANSETRON 4 MG: 2 INJECTION INTRAMUSCULAR; INTRAVENOUS at 15:36

## 2019-03-21 RX ADMIN — FAMOTIDINE 20 MG: 10 INJECTION INTRAVENOUS at 15:40

## 2019-03-21 RX ADMIN — FENTANYL CITRATE 50 MCG: 50 INJECTION, SOLUTION INTRAMUSCULAR; INTRAVENOUS at 14:01

## 2019-03-21 NOTE — ANESTHESIA POSTPROCEDURE EVALUATION
Procedure(s): 
cystoscopy, left URETEROSCOPY , stone removal and stent exchange. general, total IV anesthesia Anesthesia Post Evaluation Multimodal analgesia: multimodal analgesia not used between 6 hours prior to anesthesia start to PACU discharge Patient location during evaluation: PACU Patient participation: complete - patient participated Level of consciousness: awake Pain management: adequate Airway patency: patent Anesthetic complications: no 
Cardiovascular status: acceptable, blood pressure returned to baseline and hemodynamically stable Respiratory status: acceptable Hydration status: acceptable Vitals Value Taken Time /70 3/21/2019  5:45 PM  
Temp Pulse 65 3/21/2019  5:56 PM  
Resp 11 3/21/2019  5:56 PM  
SpO2 96 % 3/21/2019  5:56 PM  
Vitals shown include unvalidated device data.

## 2019-03-21 NOTE — PERIOP NOTES
Lactation services consulted per Lorri Oppenheim, lactation services no longer on site. [facility] nurse contacted and instructed to use \"[email or web address]" website per unit and physican protocol. Website used for discharge medications, printed and discussed with patient and mother. Wellington Parada contacted via phone for suggestions as follows Oxybutin and Flomax has a \"lack of evidence but deemed safe and recommends for patient use\"; Pyridium \"benefits not significant for risks associated\" and does not advise use; Hydromorphone \"deemed safe in low doses for short duration\" and recommends usage. These recommendations discussed with patient and mother whom agrees and has no further questions or concerns at this time.

## 2019-03-21 NOTE — DISCHARGE INSTRUCTIONS
Patient Education        Ureteroscopy: What to Expect at Home  Your Recovery  For several hours after the procedure you may have a burning feeling when you urinate. This feeling should go away within a day. Drinking a lot of water can help. Your doctor also may advise you to take medicine that numbs the burning. This medicine is called phenazopyridine. It is available by prescription and over the counter. Brand names include Pyridium and Uristat. You may have some blood in your urine for 2 or 3 days. Your doctor may prescribe an antibiotic for a day or two. This will help prevent an infection. This care sheet gives you a general idea about how long it will take for you to recover. But each person recovers at a different pace. Follow the steps below to feel better as quickly as possible. How can you care for yourself at home? Activity    · You can go back to work and other activities the next day. Diet    · Try to drink two 8-ounce glasses of water each hour for 2 hours after the procedure. This may help ease the burning when you urinate. Medicines    · Your doctor will tell you if and when you can restart your medicines. He or she will also give you instructions about taking any new medicines.     · If you take blood thinners, such as warfarin (Coumadin), clopidogrel (Plavix), or aspirin, be sure to talk to your doctor. He or she will tell you if and when to start taking those medicines again. Make sure that you understand exactly what your doctor wants you to do.     · If you take medicine to stop the burning when you urinate, take it exactly as recommended. Be safe with medicines. Call your doctor if you think you are having a problem with your medicine. You will get more details on the specific medicine your doctor recommends.     · If your doctor prescribed antibiotics, take them as directed. Do not stop taking them just because you feel better.  You need to take the full course of antibiotics.     · Ask your doctor if you can take an over-the-counter pain medicine, such as acetaminophen (Tylenol), ibuprofen (Advil, Motrin), or naproxen (Aleve). Read and follow all instructions on the label. Do not take two or more pain medicines at the same time unless the doctor told you to. Many pain medicines have acetaminophen, which is Tylenol. Too much acetaminophen (Tylenol) can be harmful.    Heat    · Take a warm bath. This may soothe the burning.     · You also can hold a warm washcloth over your urethra for comfort. (The urethra is where your urine comes out.)   Follow-up care is a key part of your treatment and safety. Be sure to make and go to all appointments, and call your doctor if you are having problems. It's also a good idea to know your test results and keep a list of the medicines you take. When should you call for help? Call 911 anytime you think you may need emergency care. For example, call if:    · You passed out (lost consciousness).     · You have chest pain, are short of breath, or cough up blood.    Call your doctor now or seek immediate medical care if:    · You have pain that does not get better after you take pain medicine.     · You have new or more blood clots in your urine. (It is normal for the urine to be pink for a few days.)     · You cannot urinate.     · You have symptoms of a urinary tract infection. These may include:  ? Pain or burning when you urinate. ? A frequent need to urinate without being able to pass much urine. ? Pain in the flank, which is just below the rib cage and above the waist on either side of the back. ? Blood in the urine. ? A fever.     · You are sick to your stomach or cannot drink fluids.     · You have signs of a blood clot in your leg (called a deep vein thrombosis), such as:  ? Pain in the calf, back of the knee, thigh, or groin. ?  Redness and swelling in your leg.    Watch closely for changes in your health, and be sure to contact your doctor if you are having any problems. Where can you learn more? Go to http://burke-armando.info/. Enter Q404 in the search box to learn more about \"Ureteroscopy: What to Expect at Home. \"  Current as of: March 14, 2018  Content Version: 11.9  © 6491-7530 [employer]. Care instructions adapted under license by [employer] (which disclaims liability or warranty for this information). If you have questions about a medical condition or this instruction, always ask your healthcare professional. [place] [address] any warranty or liability for your use of this information. DISCHARGE SUMMARY from your Nurse    The following personal items collected during your admission are returned to you:   Dental Appliance: Dental Appliances: None  Vision:    Hearing Aid:    Jewelry: Jewelry: None  Clothing: Clothing: Shirt, Socks, Pants, Footwear, Undergarments  Other Valuables: Other Valuables: Contact Lenses, Eyeglasses  Valuables sent to safe:      PATIENT INSTRUCTIONS:    After general anesthesia or intravenous sedation, for 24 hours or while taking prescription Narcotics:  · Limit your activities  · Do not drive and operate hazardous machinery  · Do not make important personal or business decisions  · Do  not drink alcoholic beverages  · If you have not urinated within 8 hours after discharge, please contact your surgeon on call. Report the following to your surgeon:  · Excessive pain, swelling, redness or odor of or around the surgical area  · Temperature over 100.5  · Nausea and vomiting lasting longer than 4 hours or if unable to take medications  · Any signs of decreased circulation or nerve impairment to extremity: change in color, persistent  numbness, tingling, coldness or increase pain  · Any questions    COUGH AND DEEP BREATHE    Breathing deep and coughing are very important exercises to do after surgery.   Deep breathing and coughing open the little air tubes and air sacks in your lungs. You take deep breaths every day. You may not even notice - it is just something you do when you sigh or yawn. It is a natural exercise you do to keep these air passages open. After surgery, take deep breaths and cough, on purpose. Coughing and deep breathing help prevent bronchitis and pneumonia after surgery. If you had chest or belly surgery, use a pillow as a \"hug esvin\" and hold it tightly to your chest or belly when you cough. DIRECTIONS:  6. Take 10 to 15 slow deep breaths every hour while awake. 7. Breathe in deeply, and hold it for 2 seconds. 8. Exhale slowly through puckered lips, like blowing up a balloon. 9. After every 4th or 5th deep breath, hug your pillow to your chest or belly and give a hard, deep cough. Yes, it will probably hurt. But doing this exercise is very important part of healing after surgery. Take your pain medicine to help you do this exercise without too much pain. IF YOU HAVE BEEN DIAGNOSED WITH SLEEP APNEA, PLEASE USE YOUR SLEEP APNEA DEVICE OR CPAP MACHINE WHEN YOU INTEND TO NAP AFTER TAKING PAIN MEDICATION. Ankle Pumps    Ankle pumps increase the circulation of oxygenated blood to your lower extremities and decrease your risk for circulation problems such as blood clots. They also stretch the muscles, tendons and ligaments in your foot and ankle, and prevent joint contracture in the ankle and foot, especially after surgeries on the legs. It is important to do ankle pump exercises regularly after surgery because immobility increases your risk for developing a blood clot. Your doctor may also have you take an Aspirin for the next few days as well. If your doctor did not ask you to take an Aspirin, consult with him before starting Aspirin therapy on your own. Slowly point your foot forward, feeling the muscles on the top of your lower leg stretch, and hold this position for 5 seconds.                   Next, pull your foot back toward you as far as possible, stretching the calf muscles, and hold that position for 5 seconds. Repeat with the other foot. Perform 10 repetitions every hour while awake for both ankles if possible (down and then up with the foot once is one repetition). You should feel gentle stretching of the muscles in your lower leg when doing this exercise. If you feel pain, or your range of motion is limited, don't  Push too hard. Only go the limit your joint and muscles will let you go. If you have increasing pain, progressively worsening leg warmth or swelling, STOP the exercise and call your doctor. Below is information about the medications your doctor is prescribing after your visit:    Other information in your discharge envelope:  []     PRESCRIPTIONS  []     PHYSICAL THERAPY PRESCRIPTION  []     APPOINTMENT CARDS  []     Regional Anesthesia Pamphlet for block or block with On-Q Catheter from Anesthesia Service  []     Medical device information sheets/pamphlets from their    []     School/work excuse note. []     /parent work excuse note. These are general instructions for a healthy lifestyle:    *  Please give a list of your current medications to your Primary Care Provider. *  Please update this list whenever your medications are discontinued, doses are      changed, or new medications (including over-the-counter products) are added. *  Please carry medication information at all times in case of emergency situations. About Smoking  No smoking / No tobacco products / Avoid exposure to second hand smoke    Surgeon General's Warning:  Quitting smoking now greatly reduces serious risk to your health. Obesity, smoking, and sedentary lifestyle greatly increases your risk for illness and disease. A healthy diet, regular physical exercise & weight monitoring are important for maintaining a healthy lifestyle.     Congestive Heart Failure  You may be retaining fluid if you have a history of heart failure or if you experience any of the following symptoms:  Weight gain of 3 pounds or more overnight or 5 pounds in a week, increased swelling in our hands or feet or shortness of breath while lying flat in bed. Please call your doctor as soon as you notice any of these symptoms; do not wait until your next office visit. Recognize signs and symptoms of STROKE:  F - face looks uneven  A - arms unable to move or move even  S - speech slurred or non-existent  T - time-call 911 as soon as signs and symptoms begin-DO NOT go         Back to bed or wait to see if you get better-TIME IS BRAIN. Warning signs of HEART ATTACK  Call 911 if you have these symptoms    · Chest discomfort. Most heart attacks involve discomfort in the center of the chest that lasts more than a few minutes, or that goes away and comes back. It can feel like uncomfortable pressure, squeezing, fullness, or pain. · Discomfort in other areas of the upper body. Symptoms can include pain or discomfort in one or both        Arms, the back, neck, jaw, or stomach. ·  Shortness of breath with or without chest discomfort. · Other signs may include breaking out in a cold sweat, nausea, or lightheadedness    Don't wait more than five minutes to call 911 - MINUTES MATTER! Fast action can save your life. Calling 911 is almost always the fastest way to get lifesaving treatment. Emergency Medical Services staff can begin treatment when they arrive - up to an hour sooner than if someone gets to the hospital by car. [facility] MEDICATION AND SIDE EFFECT GUIDE    The [facility] MEDICATION AND SIDE EFFECT GUIDE was provided to the PATIENT AND CARE PROVIDER.   Information provided includes instruction about drug purpose and common side effects for the following medications:     oxybutynin 5 mg tablet (DITROPAN)       phenazopyridine 100 mg tablet (PYRIDIUM)       tamsulosin 0.4 mg capsule (FLOMAX)  HYDROmorphone 2 mg tablet (DILAUDID)

## 2019-03-21 NOTE — BRIEF OP NOTE
BRIEF OPERATIVE NOTE    Date of Procedure: 3/21/2019   Preoperative Diagnosis: Left Kidney Stone  Postoperative Diagnosis: Left Kidney Stone    Procedure(s):  cystoscopy, left URETEROSCOPY , stone removal and stent exchange  Surgeon(s) and Role:     * Sfoy Hills MD - Primary          Surgical Staff:  Circ-1: Mary Guardado RN  Circ-2: Adele Rincon RN  Scrub Tech-1: Rao Hull; [facility]  Radiology Technologist: RT Jessy, R  Event Time In Time Out   Incision Start 1538    Incision Close 1559      Anesthesia: General   Estimated Blood Loss: none  Specimens:   ID Type Source Tests Collected by Time Destination   1 : left ureteral stone for analysis Fresh URETER, LEFT  Sofy Hills MD 3/21/2019 1552 Pathology      Findings: stone removed   Complications: none  Implants:   Implant Name Type Inv.  Item Serial No.  Lot No. LRB No. Used Action   STENT URET POLARIS ULTRA [id number] -- PERCUFLEX - SN/A  STENT URET POLARIS ULTRA 5X28 -- PERCUFLEX N/A [facility] [id number] Left 1 Explanted   STENT URET POLARIS ULTRA 5X28 -- PERCUFLEX - SNA  STENT URET POLARIS ULTRA 5X28 -- PERCUFLEX NA [facility] [id number] Left 1 Implanted       Dictation#[id number]

## 2019-03-21 NOTE — ANESTHESIA PREPROCEDURE EVALUATION
Relevant Problems No relevant active problems Anesthetic History No history of anesthetic complications Review of Systems / Medical History Patient summary reviewed, nursing notes reviewed and pertinent labs reviewed Pulmonary Within defined limits Neuro/Psych Within defined limits Cardiovascular Within defined limits GI/Hepatic/Renal 
  
 
 
Renal disease: stones Endo/Other Within defined limits Other Findings Physical Exam 
 
Airway Mallampati: II 
TM Distance: 4 - 6 cm Neck ROM: normal range of motion Mouth opening: Normal 
 
 Cardiovascular Regular rate and rhythm,  S1 and S2 normal,  no murmur, click, rub, or gallop Rhythm: regular Rate: normal 
 
 
 
 Dental 
No notable dental hx Pulmonary Breath sounds clear to auscultation Abdominal 
GI exam deferred Other Findings Anesthetic Plan ASA: 2 Anesthesia type: general and total IV anesthesia Induction: Intravenous Anesthetic plan and risks discussed with: Patient

## 2019-03-21 NOTE — H&P
History and Physical    Patient: Angel Johnston MRN: [id number]  SSN: [id number]    YOB: 1990  Age: 34 y.o. Sex: female      Subjective:      Angel Johnston is a 34 y.o. female who here today for ureteroscopy. Has stent plain from stent placed last night. Not taking meds bc she is lactating. Past Medical History:   Diagnosis Date    HX OTHER MEDICAL     jaw surgery; bone graft in jaw    Ureteral stone with hydronephrosis 3/20/2019     Past Surgical History:   Procedure Laterality Date    HX APPENDECTOMY        Family History   Problem Relation Age of Onset    Hypertension Paternal Grandfather      Social History     Tobacco Use    Smoking status: Never Smoker    Smokeless tobacco: Never Used   Substance Use Topics    Alcohol use: No      Prior to Admission medications    Medication Sig Start Date End Date Taking? Authorizing Provider   ketorolac (TORADOL) 10 mg tablet Take 10 mg by mouth every six (6) hours as needed for Pain. Yes Provider, Historical   HYDROcodone-acetaminophen (NORCO) 5-325 mg per tablet Take 1 Tab by mouth every six (6) hours as needed for Pain. Yes Provider, Historical   oxyCODONE IR (ROXICODONE) 5 mg immediate release tablet Take 1 Tab by mouth every four (4) hours as needed for Pain for up to 3 days. Max Daily Amount: 30 mg. 3/21/19 3/24/19 Yes Edis Winter MD   oxybutynin (DITROPAN) 5 mg tablet Take 1 Tab by mouth three (3) times daily as needed for Diarrhea. 3/21/19  Yes Edis Winter MD   tamsulosin [facility]) 0.4 mg capsule Take 1 Cap by mouth daily. 3/21/19  Yes Edis Winter MD   phenazopyridine (PYRIDIUM) 100 mg tablet Take 2 Tabs by mouth three (3) times daily (after meals) for 3 days.  3/21/19 3/24/19 Yes Edis Winter MD        Allergies   Allergen Reactions    Morphine Nausea and Vomiting       Review of Systems:  A comprehensive review of systems was negative except for that written in the History of Present Illness. Objective:     Vitals:    03/21/19 1406 03/21/19 1412 03/21/19 1420 03/21/19 1421   BP:   119/66 119/66   Pulse:    60   Resp:    15   Temp:       SpO2: 100% 100% 100% 100%   Weight:       Height:            Physical Exam:  GENERAL: alert, cooperative, mild distress, appears stated age  LUNG: clear  HEART:no signs of overt CHF  ABDOMEN: soft, non-distended    Assessment:     Hospital Problems  Date Reviewed: 3/20/2019    None      ureteral stone,  Stent pain    Plan:     I discussed with patient and mother in pre op area risks of ureteroscopy including but not limited to bleeding, infection, injury to nearby structures such as bladder ureter kidneys, renal hematoma, inability to access stone which would require ureteroscopy in at least a week, post op pain that is the same or worse than the pain now, pain after the stent and stone have been removed, unrecognized retained pieces of stone. I discussed the need to replace a stent after ureteroscopy for post op swelling, and if I am able to remove the stone the stent can come out on Monday. They acknowledged that they understood and agreed to proceed.      Signed By: Aleah Nesbitt MD     March 21, 2019

## 2019-03-21 NOTE — OP NOTES
[facility] 81  OPERATIVE REPORT    Name:  Rex Tavares  MR#:  [id number]  :  1990  ACCOUNT #:  [de-identified]  DATE OF SERVICE:  2019      PREOPERATIVE DIAGNOSES:  Obstructing left ureteral stone and intractable pain. POSTOPERATIVE DIAGNOSES:  Obstructing left ureteral stone and intractable pain. PROCEDURE PERFORMED:  1. Cystoscopy. 2.  Left retrograde pyelogram and culture. 3.  Left ureteral stent placement, 5-28 cm. SURGEON:  Perla Calvillo. Gorge Mckeon MD    ASSISTANT:  none    ANESTHESIA:  General.    COMPLICATIONS:  none    SPECIMENS REMOVED:  Left renal CX. IMPLANTS:   stent. ESTIMATED BLOOD LOSS:  Zero. IV FLUIDS:  1200 mL. URINE OUTPUT:  Not recorded. INDICATION:  The patient is a 31-year-old who is currently breast-feeding and found to have an obstructing ureteral stone with intractable pain. After hours, it was unfortunate that we did not have a laser or equipment available for management of the stone primarily. She consented for left stent placement and understands the need for followup regarding stone management. Risks of bleeding, infection, pain and need for additional treatment were discussed and reviewed and the left side marked per guidelines and time-out performed. PROCEDURE:  The patient was identified and time-out was performed. General anesthesia was given and the patient was prepped and draped in lithotomy position. X-ray fluoroscopy showed no clear stones in the pelvis. Cystoscopy showed no particular hypermobility or prolapse. The bladder was inspected and the area around the left ureteral orifice was not heaped up or concerning for an impacted stone. Retrograde was then done revealing a 5-mm ureteral stone about 5-6 mm above the UO. The retrograde was continued and the guidewire passed up into the kidney where a formal pyelogram was done. Fluid from the kidney showed no purulence and was sent for culture.   Size was then obtained which was 27 cm. We placed a 5-28 cm stent over the wire with a good curl noted in the renal pelvis as well as the bladder. A significant amount of dye and fluid came around the stent. The bladder was emptied and Toradol and lidocaine were given. The patient was awakened and taken to recovery room in stable condition, and given the lack of significant infection, she may require ureteroscopy in a timely manner which will be coordinated at a later date. Arianna Caraballo MD      [id number]  D:  03/20/2019 18:34  T:  03/21/2019 0:44  JOB #:  [id number]  CC:   Jonah Carson.  Jareth Oleary MD

## 2019-03-21 NOTE — PROGRESS NOTES
Discussed with patient and mother in pre op as well as mother post op that we will get consultation to determine is drugs are ok from lactation stand point. Nursing staff in recovery suggest lactation team. If they are unable to answer, then we will obtain OB/gyn consult for drug recommendations.

## 2019-03-22 NOTE — OP NOTES
[facility] 81 
OPERATIVE REPORT Name:  Darryle Bower 
MR#:  [id number] :  1990 ACCOUNT #:  [de-identified] DATE OF SERVICE:  2019 PREOPERATIVE DIAGNOSIS:  Left ureteral stone. POSTOPERATIVE DIAGNOSIS:  Left ureteral stone. PROCEDURE PERFORMED:  Cystoscopy, left ureteroscopy, stone removal with a basket and stent exchange. SURGEON:  Nilam Gillespie MD 
 
ASSISTANT:  *** 
 
ANESTHESIA:  General. 
 
COMPLICATIONS:  None. SPECIMENS REMOVED:  Left ureteral stone. IMPLANTS:  A 8 x 28 double-J ureteral stent with string tucked in the vagina. ESTIMATED BLOOD LOSS:  None. FINDINGS:  Stone removed. INDICATIONS FOR PROCEDURE:  This patient is a 28-year-old  female with history of left ureteral stone, who had ureteral stent placed last night. She came back to the hospital with intractable pain and is here today for ureteroscopy. PROCEDURE IN DETAIL:  After obtaining informed consent, the patient was brought back to the operating room, placed on the operating room table in supine position. Anesthesia was induced. Airway was established without complication. The patient was prepped and draped in the usual aseptic fashion in dorsal lithotomy position. After a final time-out, a 21-Irish rigid cystoscope was inserted into the bladder. Urethra was normal.  Bilateral ureteral orifices was normal.  There are no lesions within the bladder. Double-J stent was seen emitting from the left ureteral orifice. This was grasped and retrieved through the meatus and wired out with a 0.035 Bentson wire which was set aside. I attempted to pass a semi-rigid ureteroscope into the ureteral orifice but it was tight. I then dilate it with 8-10 ureteral dilator. I then reinserted the rigid cystoscope and was able to access the stone.   Behind the stent longitudinally and using a *** Zero Tip stone basket, I grasped the stone and was able to gently remove it from the ureter intact. It was sent to Pathology for chemical analysis. I was able to skip up all the way to the ureter and did not see secondary stones. The scope was then removed and a new 5 x 28 double-J ureteral stent was placed with good curl seen in the kidney and in the bladder on fluoroscopy indicating appropriate positioning. The string was left alone intact inside her vagina. B and O suppository was placed. The patient was then awoken from anesthesia and taken to recovery in healthy and stable condition having tolerated the procedure well. Luis Hayes MD 
 
 
[id number] 
D:  03/21/2019 16:12 
T:  03/22/2019 0:20 
JOB #:  [id number]

## 2019-03-22 NOTE — OP NOTES
Bj Castellano [facility] 81  OPERATIVE REPORT    Name:  Aida Foster  MR#:  [id number]  :  1990  ACCOUNT #:  [de-identified]  DATE OF SERVICE:  2019      PREOPERATIVE DIAGNOSIS:  Left ureteral stone. POSTOPERATIVE DIAGNOSIS:  Left ureteral stone. PROCEDURE PERFORMED:  Cystoscopy, left ureteroscopy, stone removal with a basket and stent exchange. SURGEON:  Catina Baldwin MD    ANESTHESIA:  General.    COMPLICATIONS:  None. SPECIMENS REMOVED:  Left ureteral stone. IMPLANTS:  A 8 x 28 double-J ureteral stent with string tucked in the vagina. ESTIMATED BLOOD LOSS:  None. FINDINGS:  Stone removed. INDICATIONS FOR PROCEDURE:  This patient is a 73-year-old  female with history of left ureteral stone, who had ureteral stent placed last night. She came back to the hospital with intractable pain and is here today for ureteroscopy. PROCEDURE IN DETAIL:  After obtaining informed consent, the patient was brought back to the operating room, placed on the operating room table in supine position. Anesthesia was induced. Airway was established without complication. The patient was prepped and draped in the usual aseptic fashion in dorsal lithotomy position. After a final time-out, a 21-Lithuanian rigid cystoscope was inserted into the bladder. Urethra was normal.  Bilateral ureteral orifices was normal.  There are no lesions within the bladder. Double-J stent was seen emitting from the left ureteral orifice. This was grasped and retrieved through the meatus and wired out with a 0.035 Bentson wire which was set aside. I attempted to pass a semi-rigid ureteroscope into the ureteral orifice but it was tight. I then dilated it with 8-10 ureteral dilator. I then reinserted the rigid cystoscope and was able to access the stone.   I arranged the stone longitudinally and using a Zero Tip stone basket, I grasped the stone and was able to gently remove it from the ureter intact. It was sent to Pathology for chemical analysis. I was able to go up all the way to the mid ureter and did not see secondary stones. The scope was then removed and a new 5 x 28 double-J ureteral stent was placed with good curl seen in the kidney and in the bladder on fluoroscopy indicating appropriate positioning. The string was left alone intact inside her vagina. B and O suppository was placed. The patient was then awoken from anesthesia and taken to recovery in healthy and stable condition having tolerated the procedure well.         MD LUPIS [email or web address]  D:  03/21/2019 16:12  T:  03/22/2019 0:20  JOB #:  [id number]

## 2019-03-24 LAB
BACTERIA SPEC CULT: NORMAL
BACTERIA SPEC CULT: NORMAL
GRAM STN SPEC: NORMAL
GRAM STN SPEC: NORMAL
SERVICE CMNT-IMP: NORMAL

## 2019-03-25 ENCOUNTER — APPOINTMENT (OUTPATIENT)
Dept: CT IMAGING | Age: 29
End: 2019-03-25
Attending: PHYSICIAN ASSISTANT
Payer: COMMERCIAL

## 2019-03-25 ENCOUNTER — PATIENT OUTREACH (OUTPATIENT)
Dept: OTHER | Age: 29
End: 2019-03-25

## 2019-03-25 ENCOUNTER — HOSPITAL ENCOUNTER (EMERGENCY)
Age: 29
Discharge: HOME | End: 2019-03-25
Attending: STUDENT IN AN ORGANIZED HEALTH CARE EDUCATION/TRAINING PROGRAM | Admitting: STUDENT IN AN ORGANIZED HEALTH CARE EDUCATION/TRAINING PROGRAM
Payer: COMMERCIAL

## 2019-03-25 VITALS
HEART RATE: 67 BPM | RESPIRATION RATE: 18 BRPM | HEIGHT: 67 IN | BODY MASS INDEX: 24.33 KG/M2 | SYSTOLIC BLOOD PRESSURE: 124 MMHG | WEIGHT: 155 LBS | TEMPERATURE: 98 F | OXYGEN SATURATION: 99 % | DIASTOLIC BLOOD PRESSURE: 73 MMHG

## 2019-03-25 DIAGNOSIS — N13.39 OTHER HYDRONEPHROSIS: Primary | ICD-10-CM

## 2019-03-25 LAB
ALBUMIN SERPL-MCNC: 4 G/DL (ref 3.5–5)
ALBUMIN/GLOB SERPL: 1.1 {RATIO} (ref 1.1–2.2)
ALP SERPL-CCNC: 119 U/L (ref 45–117)
ALT SERPL-CCNC: 26 U/L (ref 12–78)
ANION GAP SERPL CALC-SCNC: 8 MMOL/L (ref 5–15)
APPEARANCE UR: CLEAR
AST SERPL-CCNC: 18 U/L (ref 15–37)
BACTERIA URNS QL MICRO: NEGATIVE /HPF
BASOPHILS # BLD: 0 K/UL (ref 0–0.1)
BASOPHILS NFR BLD: 0 % (ref 0–1)
BILIRUB SERPL-MCNC: 0.8 MG/DL (ref 0.2–1)
BILIRUB UR QL: NEGATIVE
BUN SERPL-MCNC: 14 MG/DL (ref 6–20)
BUN/CREAT SERPL: 16 (ref 12–20)
CALCIUM SERPL-MCNC: 9.1 MG/DL (ref 8.5–10.1)
CHLORIDE SERPL-SCNC: 103 MMOL/L (ref 97–108)
CO2 SERPL-SCNC: 25 MMOL/L (ref 21–32)
COLOR UR: ABNORMAL
COMMENT, HOLDF: NORMAL
CREAT SERPL-MCNC: 0.85 MG/DL (ref 0.55–1.02)
DIFFERENTIAL METHOD BLD: NORMAL
EOSINOPHIL # BLD: 0.1 K/UL (ref 0–0.4)
EOSINOPHIL NFR BLD: 2 % (ref 0–7)
EPITH CASTS URNS QL MICRO: ABNORMAL /LPF
ERYTHROCYTE [DISTWIDTH] IN BLOOD BY AUTOMATED COUNT: 12.2 % (ref 11.5–14.5)
GLOBULIN SER CALC-MCNC: 3.6 G/DL (ref 2–4)
GLUCOSE SERPL-MCNC: 100 MG/DL (ref 65–100)
GLUCOSE UR STRIP.AUTO-MCNC: NEGATIVE MG/DL
HCG UR QL: NEGATIVE
HCT VFR BLD AUTO: 40.6 % (ref 35–47)
HGB BLD-MCNC: 13.4 G/DL (ref 11.5–16)
HGB UR QL STRIP: ABNORMAL
HYALINE CASTS URNS QL MICRO: ABNORMAL /LPF (ref 0–5)
IMM GRANULOCYTES # BLD AUTO: 0 K/UL (ref 0–0.04)
IMM GRANULOCYTES NFR BLD AUTO: 0 % (ref 0–0.5)
KETONES UR QL STRIP.AUTO: NEGATIVE MG/DL
LEUKOCYTE ESTERASE UR QL STRIP.AUTO: NEGATIVE
LYMPHOCYTES # BLD: 2.9 K/UL (ref 0.8–3.5)
LYMPHOCYTES NFR BLD: 41 % (ref 12–49)
MCH RBC QN AUTO: 31.3 PG (ref 26–34)
MCHC RBC AUTO-ENTMCNC: 33 G/DL (ref 30–36.5)
MCV RBC AUTO: 94.9 FL (ref 80–99)
MONOCYTES # BLD: 0.5 K/UL (ref 0–1)
MONOCYTES NFR BLD: 6 % (ref 5–13)
NEUTS SEG # BLD: 3.6 K/UL (ref 1.8–8)
NEUTS SEG NFR BLD: 51 % (ref 32–75)
NITRITE UR QL STRIP.AUTO: NEGATIVE
NRBC # BLD: 0 K/UL (ref 0–0.01)
NRBC BLD-RTO: 0 PER 100 WBC
PH UR STRIP: 7.5 [PH] (ref 5–8)
PLATELET # BLD AUTO: 282 K/UL (ref 150–400)
PMV BLD AUTO: 10.6 FL (ref 8.9–12.9)
POTASSIUM SERPL-SCNC: 4.2 MMOL/L (ref 3.5–5.1)
PROT SERPL-MCNC: 7.6 G/DL (ref 6.4–8.2)
PROT UR STRIP-MCNC: NEGATIVE MG/DL
RBC # BLD AUTO: 4.28 M/UL (ref 3.8–5.2)
RBC #/AREA URNS HPF: ABNORMAL /HPF (ref 0–5)
SAMPLES BEING HELD,HOLD: NORMAL
SODIUM SERPL-SCNC: 136 MMOL/L (ref 136–145)
SP GR UR REFRACTOMETRY: <1.005 (ref 1–1.03)
UR CULT HOLD, URHOLD: NORMAL
UROBILINOGEN UR QL STRIP.AUTO: 0.2 EU/DL (ref 0.2–1)
WBC # BLD AUTO: 7 K/UL (ref 3.6–11)
WBC URNS QL MICRO: ABNORMAL /HPF (ref 0–4)

## 2019-03-25 PROCEDURE — 74176 CT ABD & PELVIS W/O CONTRAST: CPT

## 2019-03-25 PROCEDURE — 74011250636 HC RX REV CODE- 250/636: Performed by: PHYSICIAN ASSISTANT

## 2019-03-25 PROCEDURE — 81025 URINE PREGNANCY TEST: CPT

## 2019-03-25 PROCEDURE — 85025 COMPLETE CBC W/AUTO DIFF WBC: CPT

## 2019-03-25 PROCEDURE — 81001 URINALYSIS AUTO W/SCOPE: CPT

## 2019-03-25 PROCEDURE — 96361 HYDRATE IV INFUSION ADD-ON: CPT

## 2019-03-25 PROCEDURE — 96372 THER/PROPH/DIAG INJ SC/IM: CPT

## 2019-03-25 PROCEDURE — 36415 COLL VENOUS BLD VENIPUNCTURE: CPT

## 2019-03-25 PROCEDURE — 80053 COMPREHEN METABOLIC PANEL: CPT

## 2019-03-25 PROCEDURE — 99284 EMERGENCY DEPT VISIT MOD MDM: CPT

## 2019-03-25 PROCEDURE — 96375 TX/PRO/DX INJ NEW DRUG ADDON: CPT

## 2019-03-25 PROCEDURE — 96374 THER/PROPH/DIAG INJ IV PUSH: CPT

## 2019-03-25 RX ORDER — KETOROLAC TROMETHAMINE 30 MG/ML
15 INJECTION, SOLUTION INTRAMUSCULAR; INTRAVENOUS
Status: COMPLETED | OUTPATIENT
Start: 2019-03-25 | End: 2019-03-25

## 2019-03-25 RX ORDER — ONDANSETRON 2 MG/ML
4 INJECTION INTRAMUSCULAR; INTRAVENOUS
Status: COMPLETED | OUTPATIENT
Start: 2019-03-25 | End: 2019-03-25

## 2019-03-25 RX ORDER — HYDROMORPHONE HYDROCHLORIDE 2 MG/ML
1 INJECTION, SOLUTION INTRAMUSCULAR; INTRAVENOUS; SUBCUTANEOUS
Status: COMPLETED | OUTPATIENT
Start: 2019-03-25 | End: 2019-03-25

## 2019-03-25 RX ORDER — FENTANYL CITRATE 50 UG/ML
50 INJECTION, SOLUTION INTRAMUSCULAR; INTRAVENOUS
Status: COMPLETED | OUTPATIENT
Start: 2019-03-25 | End: 2019-03-25

## 2019-03-25 RX ADMIN — SODIUM CHLORIDE 1000 ML: 900 INJECTION, SOLUTION INTRAVENOUS at 17:15

## 2019-03-25 RX ADMIN — FENTANYL CITRATE 50 MCG: 50 INJECTION, SOLUTION INTRAMUSCULAR; INTRAVENOUS at 17:17

## 2019-03-25 RX ADMIN — ONDANSETRON 4 MG: 2 INJECTION INTRAMUSCULAR; INTRAVENOUS at 17:16

## 2019-03-25 RX ADMIN — KETOROLAC TROMETHAMINE 15 MG: 30 INJECTION, SOLUTION INTRAMUSCULAR at 18:50

## 2019-03-25 RX ADMIN — HYDROMORPHONE HYDROCHLORIDE 1 MG: 2 INJECTION INTRAMUSCULAR; INTRAVENOUS; SUBCUTANEOUS at 17:36

## 2019-03-25 NOTE — ED NOTES
Patient with left flank pain, post cystoscopy and stent placement, stent is not in place. Difficulty urinating.

## 2019-03-25 NOTE — ED NOTES
Verbal shift change report given to [facility] (oncoming nurse) by Yeny Palacios, [address] [place] [address] (offgoing nurse). Report included the following information SBAR, ED Summary, MAR and Med Rec Status.

## 2019-03-26 NOTE — ED PROVIDER NOTES
Rajeev Candelario is a 34 y.o. female  who presents by private vehicle to ER with c/o Patient presents with: 
Flank Pain. Patient presents with complaints of left flank pain. Patient was seen in ED on 3/20 for same, diagnosed with a kidney stone. Patient had stent placement and stone removal done and was discharged home. On Friday patient reports the stent fell out. Patient has been been managing pain at home until tonight. Patient denies fever or chills, dneies vomiting or diarrhea. She specifically denies any fevers, chills,  vomiting, chest pain, shortness of breath, headache, rash, diarrhea, bowel changes, sweating or weight loss. PCP: Jl Villagomez MD  
PMHx significant for: Past Medical History: 
No date: HX OTHER MEDICAL Comment:  jaw surgery; bone graft in jaw 
3/20/2019: Ureteral stone with hydronephrosis PSHx significant for: Past Surgical History: 
No date: HX APPENDECTOMY Social Hx: Tobacco use: Social History Tobacco Use Smoking status: Never Smoker Smokeless tobacco: Never Used 
; EtOH use: The patient states she drinks 0 per week.; Illicit Drug use: Allergies: 
 -- Morphine -- Nausea and Vomiting There are no other complaints, changes or physical findings at this time. Past Medical History:  
Diagnosis Date  HX OTHER MEDICAL   
 jaw surgery; bone graft in jaw  Ureteral stone with hydronephrosis 3/20/2019 Past Surgical History:  
Procedure Laterality Date  HX APPENDECTOMY Family History:  
Problem Relation Age of Onset  Hypertension Paternal Grandfather Social History Socioeconomic History  Marital status:  Spouse name: Garrett Pay  Number of children: 0  
 Years of education: Not on file  Highest education level: Not on file Occupational History  Occupation: Student  Occupation:  Social Needs  Financial resource strain: Not on file  Food insecurity:  
  Worry: Not on file Inability: Not on file  Transportation needs:  
  Medical: Not on file Non-medical: Not on file Tobacco Use  Smoking status: Never Smoker  Smokeless tobacco: Never Used Substance and Sexual Activity  Alcohol use: No  
 Drug use: No  
 Sexual activity: Yes  
  Partners: Male Birth control/protection: None Lifestyle  Physical activity:  
  Days per week: Not on file Minutes per session: Not on file  Stress: Not on file Relationships  Social connections:  
  Talks on phone: Not on file Gets together: Not on file Attends [employer] service: Not on file Active member of club or organization: Not on file Attends meetings of clubs or organizations: Not on file Relationship status: Not on file  Intimate partner violence:  
  Fear of current or ex partner: Not on file Emotionally abused: Not on file Physically abused: Not on file Forced sexual activity: Not on file Other Topics Concern  Not on file Social History Narrative  Not on file ALLERGIES: Morphine Review of Systems Constitutional: Negative for activity change, chills and fever. HENT: Negative for congestion, rhinorrhea and sore throat. Respiratory: Negative for shortness of breath. Cardiovascular: Negative for chest pain and leg swelling. Gastrointestinal: Positive for nausea. Negative for abdominal pain, diarrhea and vomiting. Genitourinary: Negative for dysuria, vaginal bleeding and vaginal discharge. Musculoskeletal: Positive for back pain. Negative for arthralgias and myalgias. Neurological: Negative for dizziness. Psychiatric/Behavioral: Negative for confusion. All other systems reviewed and are negative. Vitals:  
 03/25/19 1745 03/25/19 1746 03/25/19 1800 03/25/19 1815 BP: 123/79  121/88 124/73 Pulse:      
Resp:      
Temp:      
SpO2: 91% 99% 100% 99% Weight:      
Height:      
      
 
Physical Exam  
 Constitutional: She is oriented to person, place, and time. She appears well-developed. HENT:  
Head: Normocephalic and atraumatic. Right Ear: External ear normal.  
Left Ear: External ear normal.  
Nose: Nose normal.  
Mouth/Throat: Oropharynx is clear and moist. No oropharyngeal exudate. Eyes: Conjunctivae, EOM and lids are normal. Right eye exhibits no discharge. Left eye exhibits no discharge. Neck: Normal range of motion. No tracheal deviation present. No thyromegaly present. Cardiovascular: Normal rate, regular rhythm, normal heart sounds and intact distal pulses. Pulmonary/Chest: Effort normal and breath sounds normal.  
Abdominal: Soft. Normal appearance and bowel sounds are normal. There is no tenderness. There is CVA tenderness (left). Musculoskeletal: Normal range of motion. Neurological: She is alert and oriented to person, place, and time. Skin: Skin is warm and dry. Psychiatric: She has a normal mood and affect. Judgment normal.  
  
 
MDM Number of Diagnoses or Management Options Other hydronephrosis:  
Diagnosis management comments: Assesment/Plan- 34 y.o. Patient presents with: 
Flank Pain 
differential includes: hydronephrosis, kidney stone, UTI, acute renal failure. Labs and imaging reviewed with normal creatinine, no signs of infection in ua. CT shows left hydronephrosis. Recommend Urology follow up. Patient educated on reasons to return to the ED. Procedures

## 2019-03-27 ENCOUNTER — PATIENT OUTREACH (OUTPATIENT)
Dept: OTHER | Age: 29
End: 2019-03-27

## 2019-03-27 LAB
CA PHOS MFR STONE: 30 %
COLOR STONE: NORMAL
COM MFR STONE: 70 %
COMMENT, 519994: NORMAL
COMPOSITION, 120440: NORMAL
DISCLAIMER, STO32L: NORMAL
NIDUS STONE QL: NORMAL
SIZE STONE: NORMAL MM
WT STONE: 26.1 MG

## 2019-03-27 NOTE — PROGRESS NOTES
Patient identified as eligible for [address] [place] [address] services. Second telephone outreach attempted. Left discreet voicemail with this CM confidential contact information. Will send UTR letter.

## 2019-03-27 NOTE — LETTER
3/27/2019 11:26 AM 
 
Ms. Amina Mandujano [address] [facility] [address] Dear MsDonato Shellie, My name is Jeanette Mathew, Employee Care Manager for [facility], and I have been trying to reach you. The Employee Care  is a free-of-charge, confidential service provided to our employees and their family members covered by the [employer]. Part of my job is to follow up with members who have recently been in the hospital or emergency room, to help them coordinate their care and answer questions they may have about their visit. I am able to provide assistance with medication questions, scheduling needed follow-up appointments, and arranging services like home health or home medical equipment. I can also provide education regarding your hospital or ER visit as well as your medical conditions. As healthcare providers, we know that patients do better when they have close follow up with a primary care provider (PCP), especially after a hospital or emergency department visit. If you do not have a PCP, I can help you find one that is convenient to you and covered by your insurance. I can also help you understand any after visit instructions, such as what symptoms to watch out for, or any new or changed medications. Remember that you can access your After Visit Summary by logging into your Am[employer] account. If you do not have a Am[employer] account, I can help you request access. Our program is designed to provide you with the opportunity to have a [facility] care manager partner with you for your healthcare needs. Please contact me at the below number if I can provide you with assistance for any of the above services. Sincerely, Jeanette Mathew RN, BSN  Employee Care Manager [address] [place] [address], [address] [place] [address] [address] [facility] Cell [phone] Fax [email or web address] 
 Vishal DEL VALLE [email or web address]. aspx

## 2019-04-05 ENCOUNTER — PATIENT OUTREACH (OUTPATIENT)
Dept: OTHER | Age: 29
End: 2019-04-05

## 2019-04-05 NOTE — LETTER
4/5/2019 2:04 PM 
 
Ms. Miguel Celis [address] [facility] [address] Dear Ms. Shellie, My name is Lui Bo, Employee Care Manager for [facility], and I have been trying to reach you. The Employee Care Management [facility]) program is a free-of-charge, confidential service provided to our employees and their family members covered by the [facility]. I can help you with care transitions such as when you come home from the hospital, when help is needed to manage your disease, or when you need assistance coordinating services or appointments. [facility] now partners with [facility]. If you are a qualifying employee, you may receive an additional 10 wellness incentive points for every month of active participation with an Employee Care Manager. As healthcare providers, we know that patients do better when they have close follow up with a primary care provider (PCP). I can help you find one that is convenient to you and covered by your insurance. I can also help you understand any after visit instructions, such as what symptoms to watch out for, or any new or changed medications. We can work together using your preferred communication -- telephone, email, [employer]. If you do not have a [employer] account, I can help you request access. Our program is designed to provide you with the opportunity to have a [facility] care manager partner with you for your healthcare needs. Due to not being able to reach you, I am closing out the current program, but will remain available to you should you have any questions. Please contact me at the below number if I can provide you with assistance for any of the above services. Lui Bo RN, BSN  Employee Care Manager [address] [place] [address], [address] [place] [address] [address] [facility] Cell [phone] Fax [email or web address] Vishal [employer] [email or web address]. aspx

## 2020-01-10 LAB
ANTIBODY SCREEN, EXTERNAL: NEGATIVE
CHLAMYDIA, EXTERNAL: NEGATIVE
HBSAG, EXTERNAL: NONREACTIVE
HIV, EXTERNAL: NONREACTIVE
N. GONORRHEA, EXTERNAL: NEGATIVE
RUBELLA, EXTERNAL: NORMAL
T. PALLIDUM, EXTERNAL: NONREACTIVE
TYPE, ABO & RH, EXTERNAL: NORMAL

## 2020-07-24 LAB — GRBS, EXTERNAL: NEGATIVE

## 2020-08-02 ENCOUNTER — HOSPITAL ENCOUNTER (OUTPATIENT)
Dept: LAB | Age: 30
Discharge: HOME | End: 2020-08-02
Payer: COMMERCIAL

## 2020-08-02 DIAGNOSIS — U07.1 COVID-19: ICD-10-CM

## 2020-08-02 PROCEDURE — 87635 SARS-COV-2 COVID-19 AMP PRB: CPT

## 2020-08-03 LAB — SARS-COV-2, COV2NT: NOT DETECTED

## 2020-08-10 ENCOUNTER — HOSPITAL ENCOUNTER (INPATIENT)
Age: 30
LOS: 1 days | Discharge: HOME | End: 2020-08-11
Attending: STUDENT IN AN ORGANIZED HEALTH CARE EDUCATION/TRAINING PROGRAM | Admitting: OBSTETRICS & GYNECOLOGY
Payer: COMMERCIAL

## 2020-08-10 ENCOUNTER — ANESTHESIA (OUTPATIENT)
Dept: LABOR AND DELIVERY | Age: 30
End: 2020-08-10
Payer: COMMERCIAL

## 2020-08-10 ENCOUNTER — ANESTHESIA EVENT (OUTPATIENT)
Dept: LABOR AND DELIVERY | Age: 30
End: 2020-08-10
Payer: COMMERCIAL

## 2020-08-10 PROBLEM — Z34.90 PREGNANCY: Status: ACTIVE | Noted: 2020-08-10

## 2020-08-10 LAB
BASOPHILS # BLD: 0 K/UL (ref 0–0.1)
BASOPHILS NFR BLD: 0 % (ref 0–1)
DIFFERENTIAL METHOD BLD: ABNORMAL
EOSINOPHIL # BLD: 0.1 K/UL (ref 0–0.4)
EOSINOPHIL NFR BLD: 1 % (ref 0–7)
ERYTHROCYTE [DISTWIDTH] IN BLOOD BY AUTOMATED COUNT: 12.9 % (ref 11.5–14.5)
HCT VFR BLD AUTO: 40.7 % (ref 35–47)
HGB BLD-MCNC: 14.1 G/DL (ref 11.5–16)
IMM GRANULOCYTES # BLD AUTO: 0.1 K/UL (ref 0–0.04)
IMM GRANULOCYTES NFR BLD AUTO: 1 % (ref 0–0.5)
LYMPHOCYTES # BLD: 2.2 K/UL (ref 0.8–3.5)
LYMPHOCYTES NFR BLD: 19 % (ref 12–49)
MCH RBC QN AUTO: 33.5 PG (ref 26–34)
MCHC RBC AUTO-ENTMCNC: 34.6 G/DL (ref 30–36.5)
MCV RBC AUTO: 96.7 FL (ref 80–99)
MONOCYTES # BLD: 1 K/UL (ref 0–1)
MONOCYTES NFR BLD: 8 % (ref 5–13)
NEUTS SEG # BLD: 8.5 K/UL (ref 1.8–8)
NEUTS SEG NFR BLD: 71 % (ref 32–75)
NRBC # BLD: 0 K/UL (ref 0–0.01)
NRBC BLD-RTO: 0 PER 100 WBC
PLATELET # BLD AUTO: 229 K/UL (ref 150–400)
PMV BLD AUTO: 10.4 FL (ref 8.9–12.9)
RBC # BLD AUTO: 4.21 M/UL (ref 3.8–5.2)
WBC # BLD AUTO: 11.9 K/UL (ref 3.6–11)

## 2020-08-10 PROCEDURE — 75810000275 HC EMERGENCY DEPT VISIT NO LEVEL OF CARE

## 2020-08-10 PROCEDURE — 74011250636 HC RX REV CODE- 250/636: Performed by: ANESTHESIOLOGY

## 2020-08-10 PROCEDURE — 36415 COLL VENOUS BLD VENIPUNCTURE: CPT

## 2020-08-10 PROCEDURE — 76060000078 HC EPIDURAL ANESTHESIA: Performed by: ANESTHESIOLOGY

## 2020-08-10 PROCEDURE — 77030019905 HC CATH URETH INTMIT MDII -A

## 2020-08-10 PROCEDURE — 74011250636 HC RX REV CODE- 250/636: Performed by: OBSTETRICS & GYNECOLOGY

## 2020-08-10 PROCEDURE — 74011000250 HC RX REV CODE- 250: Performed by: ANESTHESIOLOGY

## 2020-08-10 PROCEDURE — 75410000003 HC RECOV DEL/VAG/CSECN EA 0.5 HR: Performed by: OBSTETRICS & GYNECOLOGY

## 2020-08-10 PROCEDURE — 75410000002 HC LABOR FEE PER 1 HR: Performed by: OBSTETRICS & GYNECOLOGY

## 2020-08-10 PROCEDURE — 75410000000 HC DELIVERY VAGINAL/SINGLE: Performed by: OBSTETRICS & GYNECOLOGY

## 2020-08-10 PROCEDURE — 00HU33Z INSERTION OF INFUSION DEVICE INTO SPINAL CANAL, PERCUTANEOUS APPROACH: ICD-10-PCS | Performed by: ANESTHESIOLOGY

## 2020-08-10 PROCEDURE — 85025 COMPLETE CBC W/AUTO DIFF WBC: CPT

## 2020-08-10 PROCEDURE — 65270000029 HC RM PRIVATE

## 2020-08-10 PROCEDURE — 77030014125 HC TY EPDRL BBMI -B: Performed by: ANESTHESIOLOGY

## 2020-08-10 RX ORDER — SODIUM CHLORIDE, SODIUM LACTATE, POTASSIUM CHLORIDE, CALCIUM CHLORIDE 600; 310; 30; 20 MG/100ML; MG/100ML; MG/100ML; MG/100ML
125 INJECTION, SOLUTION INTRAVENOUS CONTINUOUS
Status: DISCONTINUED | OUTPATIENT
Start: 2020-08-10 | End: 2020-08-11

## 2020-08-10 RX ORDER — ACETAMINOPHEN 325 MG/1
650 TABLET ORAL
Status: DISCONTINUED | OUTPATIENT
Start: 2020-08-10 | End: 2020-08-12 | Stop reason: HOSPADM

## 2020-08-10 RX ORDER — DIPHENHYDRAMINE HCL 25 MG
25 CAPSULE ORAL
Status: DISCONTINUED | OUTPATIENT
Start: 2020-08-10 | End: 2020-08-12 | Stop reason: HOSPADM

## 2020-08-10 RX ORDER — OXYTOCIN/0.9 % SODIUM CHLORIDE 30/500 ML
999 PLASTIC BAG, INJECTION (ML) INTRAVENOUS
Status: DISCONTINUED | OUTPATIENT
Start: 2020-08-10 | End: 2020-08-11

## 2020-08-10 RX ORDER — ONDANSETRON 4 MG/1
4 TABLET, ORALLY DISINTEGRATING ORAL
Status: ACTIVE | OUTPATIENT
Start: 2020-08-10 | End: 2020-08-11

## 2020-08-10 RX ORDER — HYDROCORTISONE ACETATE PRAMOXINE HCL 2.5; 1 G/100G; G/100G
CREAM TOPICAL AS NEEDED
Status: DISCONTINUED | OUTPATIENT
Start: 2020-08-10 | End: 2020-08-12 | Stop reason: HOSPADM

## 2020-08-10 RX ORDER — SODIUM CHLORIDE 0.9 % (FLUSH) 0.9 %
5-40 SYRINGE (ML) INJECTION EVERY 8 HOURS
Status: DISCONTINUED | OUTPATIENT
Start: 2020-08-10 | End: 2020-08-11

## 2020-08-10 RX ORDER — FENTANYL CITRATE 50 UG/ML
INJECTION, SOLUTION INTRAMUSCULAR; INTRAVENOUS AS NEEDED
Status: DISCONTINUED | OUTPATIENT
Start: 2020-08-10 | End: 2020-08-11 | Stop reason: HOSPADM

## 2020-08-10 RX ORDER — ZOLPIDEM TARTRATE 5 MG/1
5 TABLET ORAL
Status: DISCONTINUED | OUTPATIENT
Start: 2020-08-10 | End: 2020-08-12 | Stop reason: HOSPADM

## 2020-08-10 RX ORDER — NALOXONE HYDROCHLORIDE 0.4 MG/ML
0.4 INJECTION, SOLUTION INTRAMUSCULAR; INTRAVENOUS; SUBCUTANEOUS AS NEEDED
Status: DISCONTINUED | OUTPATIENT
Start: 2020-08-10 | End: 2020-08-10 | Stop reason: HOSPADM

## 2020-08-10 RX ORDER — OXYTOCIN/0.9 % SODIUM CHLORIDE 30/500 ML
PLASTIC BAG, INJECTION (ML) INTRAVENOUS
Status: DISPENSED
Start: 2020-08-10 | End: 2020-08-11

## 2020-08-10 RX ORDER — BUPIVACAINE HYDROCHLORIDE 2.5 MG/ML
INJECTION, SOLUTION EPIDURAL; INFILTRATION; INTRACAUDAL AS NEEDED
Status: DISCONTINUED | OUTPATIENT
Start: 2020-08-10 | End: 2020-08-11 | Stop reason: HOSPADM

## 2020-08-10 RX ORDER — OXYCODONE AND ACETAMINOPHEN 5; 325 MG/1; MG/1
1 TABLET ORAL
Status: DISCONTINUED | OUTPATIENT
Start: 2020-08-10 | End: 2020-08-12 | Stop reason: HOSPADM

## 2020-08-10 RX ORDER — ACETAMINOPHEN 325 MG/1
650 TABLET ORAL
COMMUNITY
End: 2020-08-11

## 2020-08-10 RX ORDER — LIDOCAINE HYDROCHLORIDE AND EPINEPHRINE 15; 5 MG/ML; UG/ML
INJECTION, SOLUTION EPIDURAL AS NEEDED
Status: DISCONTINUED | OUTPATIENT
Start: 2020-08-10 | End: 2020-08-11 | Stop reason: HOSPADM

## 2020-08-10 RX ORDER — NALOXONE HYDROCHLORIDE 0.4 MG/ML
0.4 INJECTION, SOLUTION INTRAMUSCULAR; INTRAVENOUS; SUBCUTANEOUS AS NEEDED
Status: DISCONTINUED | OUTPATIENT
Start: 2020-08-10 | End: 2020-08-12 | Stop reason: HOSPADM

## 2020-08-10 RX ORDER — OXYTOCIN/0.9 % SODIUM CHLORIDE 20/1000 ML
125-500 PLASTIC BAG, INJECTION (ML) INTRAVENOUS ONCE
Status: ACTIVE | OUTPATIENT
Start: 2020-08-10 | End: 2020-08-11

## 2020-08-10 RX ORDER — IBUPROFEN 800 MG/1
800 TABLET ORAL EVERY 8 HOURS
Status: DISCONTINUED | OUTPATIENT
Start: 2020-08-10 | End: 2020-08-12 | Stop reason: HOSPADM

## 2020-08-10 RX ORDER — SODIUM CHLORIDE 0.9 % (FLUSH) 0.9 %
5-40 SYRINGE (ML) INJECTION AS NEEDED
Status: DISCONTINUED | OUTPATIENT
Start: 2020-08-10 | End: 2020-08-12 | Stop reason: HOSPADM

## 2020-08-10 RX ORDER — SWAB
1 SWAB, NON-MEDICATED MISCELLANEOUS DAILY
Status: DISCONTINUED | OUTPATIENT
Start: 2020-08-11 | End: 2020-08-12 | Stop reason: HOSPADM

## 2020-08-10 RX ORDER — SIMETHICONE 80 MG
80 TABLET,CHEWABLE ORAL
Status: DISCONTINUED | OUTPATIENT
Start: 2020-08-10 | End: 2020-08-12 | Stop reason: HOSPADM

## 2020-08-10 RX ADMIN — BUPIVACAINE HYDROCHLORIDE 5 ML: 2.5 INJECTION, SOLUTION EPIDURAL; INFILTRATION; INTRACAUDAL; PERINEURAL at 19:42

## 2020-08-10 RX ADMIN — SODIUM CHLORIDE, SODIUM LACTATE, POTASSIUM CHLORIDE, AND CALCIUM CHLORIDE 1000 ML: 600; 310; 30; 20 INJECTION, SOLUTION INTRAVENOUS at 19:41

## 2020-08-10 RX ADMIN — FENTANYL CITRATE 25 MCG: 0.05 INJECTION, SOLUTION INTRAMUSCULAR; INTRAVENOUS at 19:29

## 2020-08-10 RX ADMIN — LIDOCAINE HYDROCHLORIDE AND EPINEPHRINE 3.5 ML: 15; 5 INJECTION, SOLUTION EPIDURAL at 19:33

## 2020-08-10 RX ADMIN — FENTANYL CITRATE 75 MCG: 0.05 INJECTION, SOLUTION INTRAMUSCULAR; INTRAVENOUS at 19:33

## 2020-08-10 NOTE — ANESTHESIA PREPROCEDURE EVALUATION
Relevant Problems   No relevant active problems       Anesthetic History   No history of anesthetic complications            Review of Systems / Medical History  Patient summary reviewed and nursing notes reviewed    Pulmonary  Within defined limits                 Neuro/Psych   Within defined limits           Cardiovascular  Within defined limits                Exercise tolerance: >4 METS     GI/Hepatic/Renal         Renal disease: stones       Endo/Other             Other Findings   Comments:            Physical Exam    Airway  Mallampati: II    Neck ROM: normal range of motion   Mouth opening: Normal     Cardiovascular               Dental         Pulmonary                 Abdominal         Other Findings            Anesthetic Plan    ASA: 2  Anesthesia type: spinal and epidural            Anesthetic plan and risks discussed with: Patient and Spouse      Informed consent obtained.

## 2020-08-10 NOTE — ANESTHESIA PROCEDURE NOTES
CSE Block    Start time: 8/10/2020 7:27 PM  End time: 8/10/2020 7:47 PM  Performed by: Mica Gunderson DO  Authorized by: Mica Gunderson DO     Pre-Procedure  Indications: procedure for pain    preanesthetic checklist: patient identified, risks and benefits discussed, anesthesia consent, patient being monitored and timeout performed    Timeout Time: 19:25        Procedure:   Patient Position:  Seated  Prep Region:  Lumbar  Prep: Betadine    Location:  L3-4    Epidural Needle:   Needle Type:  Jeffy Reed  Needle Gauge:  18 G  Injection Technique:  Loss of resistance using air  Attempts:  1    Spinal Needle:   Needle Type:   Dinh  Needle Gauge:  27 G    Catheter:   Catheter Type:  Standard  Catheter Size:  20 G  Catheter at Skin Depth (cm):  9.5  Depth in Epidural Space (cm):  2.5  Events: no blood with aspiration, no cerebrospinal fluid with aspiration, no paresthesia and negative aspiration test      Assessment:   Catheter Secured:  Tegaderm and tape  Insertion:  Uncomplicated  Patient tolerance:  Patient tolerated the procedure well with no immediate complications

## 2020-08-10 NOTE — H&P
Labor and Delivery Admission Note  8/10/2020    31 y.o., , female, G3 P  Estimated Date of Delivery: 8/15/20 by dates and US presents at 44 2/7 weeks with contractions and SROM. She presented to the ED with painful contractions and feeling like the \"baby was coming\". A code white was called and pt was 6/80/0 in ED so transferred to L and D at [facility] 19 good fetal movement, no bleeding, and has strong contractions. She ruptured at 36 for clear fluid. SHe states she had an uncomplicated pregnancy. PNC: Blood type: A            RH: pos            Rubella: immune            SVII serology: NR             GBS status: neg  POB:   X2, pelvis proven to 6#14    Pgyn:  Denies    Past Medical History:   Diagnosis Date    HX OTHER MEDICAL     jaw surgery; bone graft in jaw    Ureteral stone with hydronephrosis 3/20/2019     Past Surgical History:   Procedure Laterality Date    HX APPENDECTOMY       OB/GYN: Pete    Meds:   Current Facility-Administered Medications   Medication Dose Route Frequency    oxytocin in normal saline (PITOCIN) 30 unit/500 mL infusion        lactated ringers bolus infusion 500 mL  500 mL IntraVENous PRN    naloxone (NARCAN) injection 0.4 mg  0.4 mg IntraVENous PRN    oxytocin (PITOCIN) 30 units/500 ml NS  999 mL/hr IntraVENous TITRATE     Allergies:    Allergies   Allergen Reactions    Morphine Nausea and Vomiting     Pertinent ROS: see HPI, otherwise noncontributory     Family History   Problem Relation Age of Onset    Hypertension Paternal Grandfather      Social History     Socioeconomic History    Marital status:      Spouse name: Nic Siegle Number of children: 0    Years of education: Not on file    Highest education level: Not on file   Occupational History    Occupation: Student    Occupation: [employer]   Social Needs    Financial resource strain: Not on file    Food insecurity     Worry: Not on file     Inability: Not on file   [employer] needs     Medical: Not on file     Non-medical: Not on file   Tobacco Use    Smoking status: Never Smoker    Smokeless tobacco: Never Used   Substance and Sexual Activity    Alcohol use: No    Drug use: No    Sexual activity: Yes     Partners: Male     Birth control/protection: None   Lifestyle    Physical activity     Days per week: Not on file     Minutes per session: Not on file    Stress: Not on file   Relationships    Social connections     Talks on phone: Not on file     Gets together: Not on file     Attends Nondenominational service: Not on file     Active member of club or organization: Not on file     Attends meetings of clubs or organizations: Not on file     Relationship status: Not on file    Intimate partner violence     Fear of current or ex partner: Not on file     Emotionally abused: Not on file     Physically abused: Not on file     Forced sexual activity: Not on file   Other Topics Concern    Not on file   Social History Narrative    Not on file       OBJECTIVE:  Gravid , female NAD  No data recorded. Visit Vitals  Ht 5' 7\" (1.702 m)   Wt 86.2 kg (190 lb)   BMI 29.76 kg/m²       Labs:    Lab Results   Component Value Date/Time    WBC 7.0 2019 05:04 PM       Exam:  HEENT:  normal   Lungs:  clear  Cor:  RRR  Abdomen:  Fundal height c/w GA                    Soft between UC                    Clinical EFW 7 1/2  Fetal heart rate tracins with accels, difficult to trace due to pts discomfort with active labor  Contraction pattern: Q2-3 palpated  Cervix:  8/80/0  Fluid:  Grossly ruptured, clear  Pelvimetry:  AP-good                      Arch- adequate                      Sidewalls- adequate                      Pelvis feels adequate for fetus.     Impression:  IUP at 39 2/7 weeks in active labor, GBS negtive, uncomplicated    Plan: Anticipate            Epidural as desired     Jayshree Client, MD

## 2020-08-10 NOTE — L&D DELIVERY NOTE
This patient was 30 or more weeks gestation at the time of [facility] go-live. For complete information pertaining to this patient's pregnancy, please refer to the paper chart and ACOG form. Delivery Note    Obstetrician:  Sj Henry MD    Assistant: none    Pre-Delivery Diagnosis: Term pregnancy    Post-Delivery Diagnosis: Living  infant(s) and Female    Intrapartum Event: None    Procedure: Spontaneous vaginal delivery    Epidural: YES    Monitor:  Fetal Heart Tones - External    Indications for instrumental delivery: none    Estimated Blood Loss:  100  Episiotomy: n/a    Laceration(s):  none    Laceration(s) repair: NO    Presentation: Cephalic    Fetal Description: gil    Fetal Position: Left Occiput Anterior    Birth Weight: pending    Birth Length: pending    Apgar - One Minute: 9    Apgar - Five Minutes: 9    Umbilical Cord: 3 vessels present    Specimens: placenta, discarded           Complications:  none           Cord Blood Results:   Information for the patient's :  Jose Roberto Mendoza [[id number]]   No results found for: Mona Killian, [address]     Prenatal Labs:     Lab Results   Component Value Date/Time    ABO,Rh A Positive 2017    HBsAg, External negative 2017    HIV, External negative 2017    Rubella, External positive 2017    Gonorrhea, External Negative 2013    Chlamydia, External Negative 2013    GrBStrep, External negative 2018        Attending Attestation: I performed the procedure    Signed By:  Sj Henry MD     August 10, 2020

## 2020-08-10 NOTE — PROGRESS NOTES
183 Patient arrived  at 39w 2d. Patient was checked in ED by Dr. Shon Barros and per MD patient is 7cm. Patient states she SROM at 1730. Patient denies any HTN, GDM, or asthma. Patient states she is GBS negative. 183 EFM connected by this RN.  20G IV placed in right wrist by Zita Willoughby RN.  LR fluid bolus initated by this RN. Patient states that she has not had an epidural with previous pregnancies. Patient states she wants to go natural this time. 190 Bedside and Verbal shift change report given to Dae Shaffer RN (oncoming nurse) by Lizy Benavidez RN (offgoing nurse). Report included the following information SBAR .

## 2020-08-11 VITALS
OXYGEN SATURATION: 93 % | WEIGHT: 190 LBS | RESPIRATION RATE: 16 BRPM | DIASTOLIC BLOOD PRESSURE: 62 MMHG | HEART RATE: 60 BPM | SYSTOLIC BLOOD PRESSURE: 118 MMHG | TEMPERATURE: 97.8 F | HEIGHT: 67 IN | BODY MASS INDEX: 29.82 KG/M2

## 2020-08-11 PROCEDURE — 74011250637 HC RX REV CODE- 250/637: Performed by: OBSTETRICS & GYNECOLOGY

## 2020-08-11 RX ORDER — IBUPROFEN 800 MG/1
800 TABLET ORAL
Qty: 60 TAB | Refills: 1 | Status: SHIPPED | OUTPATIENT
Start: 2020-08-11

## 2020-08-11 RX ORDER — DOCUSATE SODIUM 100 MG/1
100 CAPSULE, LIQUID FILLED ORAL DAILY
Status: DISCONTINUED | OUTPATIENT
Start: 2020-08-11 | End: 2020-08-12 | Stop reason: HOSPADM

## 2020-08-11 RX ORDER — OXYCODONE AND ACETAMINOPHEN 5; 325 MG/1; MG/1
1 TABLET ORAL
Qty: 12 TAB | Refills: 0 | Status: SHIPPED | OUTPATIENT
Start: 2020-08-11 | End: 2020-08-18

## 2020-08-11 RX ADMIN — IBUPROFEN 800 MG: 800 TABLET ORAL at 17:04

## 2020-08-11 RX ADMIN — IBUPROFEN 800 MG: 800 TABLET ORAL at 09:28

## 2020-08-11 RX ADMIN — ACETAMINOPHEN 650 MG: 325 TABLET ORAL at 13:34

## 2020-08-11 RX ADMIN — ACETAMINOPHEN 650 MG: 325 TABLET ORAL at 18:17

## 2020-08-11 RX ADMIN — DOCUSATE SODIUM 100 MG: 100 CAPSULE, LIQUID FILLED ORAL at 13:34

## 2020-08-11 RX ADMIN — MUPIROCIN: 20 OINTMENT TOPICAL at 18:17

## 2020-08-11 RX ADMIN — ACETAMINOPHEN 650 MG: 325 TABLET ORAL at 05:50

## 2020-08-11 RX ADMIN — IBUPROFEN 800 MG: 800 TABLET ORAL at 00:34

## 2020-08-11 RX ADMIN — Medication 1 TABLET: at 09:28

## 2020-08-11 NOTE — LACTATION NOTE
This note was copied from a baby's chart.  revisited mother to see if she needed breastfeeding assistance. She was in bathroom. [facility] states baby nursed well at 80. Informed [facility] to have mother call [facility] if she needs any breastfeeding assistance or has any concerns.

## 2020-08-11 NOTE — PROGRESS NOTES
Pt sitting for epidural placement    - Pt laid down by nurse, pt feels pressure, SVE 10, Dr. Nima Parikh called to room for delivery    -  of viable female infant    - Pt up to BR to void, pt unable to void, pt requests to be straight cath again, 800ml of urine drained    - SBAR OUT Report: Mother    Verbal report given to ANYI Roberts(full name & credentials) on this patient, who is now being transferred to MIU (unit) for routine progression of care. TReport consisted of patient's Situation, Background, Assessment and Recommendations (SBAR).  ID bands were compared with the identification form, and verified with the patient and receiving nurse. Information from the SBAR and the [address] Report was reviewed with the receiving nurse; opportunity for questions and clarification provided.

## 2020-08-11 NOTE — PROGRESS NOTES
SBAR OUT Report: BABY    Verbal report given to Erica Carvalho Rn (full name and credentials) on this patient, being transferred to MIU (unit) for routine progression of care. Report consisted of Situation, Background, Assessment, and Recommendations (SBAR). [place] ID bands were compared with the identification form, and verified with the patient's mother and receiving nurse. Information from the SBAR and Intake/Output and the Jona Report was reviewed with the receiving nurse. According to the estimated gestational age scale, this infant is 39.0 . BETA STREP:   The mother's Group Beta Strep (GBS) result was negative. Prenatal care was received by this patients mother. Opportunity for questions and clarification provided.

## 2020-08-11 NOTE — PROGRESS NOTES
Bedside shift change report given to Larry Nance RN (oncoming nurse) by Dinesh Amaro RN (offgoing nurse). Report included the following information SBAR, Kardex and MAR.

## 2020-08-11 NOTE — ED PROVIDER NOTES
Patient is a 80-year-old female presenting to the emergency department in labor. Upon arrival patient states that she was [place] a baby\" she was taken emergently to bed to at which time labor and delivery came down and OB hospitalist check patient cervix she was 6 cm dilated was taken straight upstairs to labor and delivery at that time.            Past Medical History:   Diagnosis Date    HX OTHER MEDICAL     jaw surgery; bone graft in jaw    Ureteral stone with hydronephrosis 3/20/2019       Past Surgical History:   Procedure Laterality Date    HX APPENDECTOMY           Family History:   Problem Relation Age of Onset    Hypertension Paternal Grandfather        Social History     Socioeconomic History    Marital status:      Spouse name: Nena Manzanares Number of children: 0    Years of education: Not on file    Highest education level: Not on file   Occupational History    Occupation: Student    Occupation:    Social Needs    Financial resource strain: Not on file    Food insecurity     Worry: Not on file     Inability: Not on file   [employer] needs     Medical: Not on file     Non-medical: Not on file   Tobacco Use    Smoking status: Never Smoker    Smokeless tobacco: Never Used   Substance and Sexual Activity    Alcohol use: No    Drug use: No    Sexual activity: Yes     Partners: Male     Birth control/protection: None   Lifestyle    Physical activity     Days per week: Not on file     Minutes per session: Not on file    Stress: Not on file   Relationships    Social connections     Talks on phone: Not on file     Gets together: Not on file     Attends Zoroastrian service: Not on file     Active member of club or organization: Not on file     Attends meetings of clubs or organizations: Not on file     Relationship status: Not on file    Intimate partner violence     Fear of current or ex partner: Not on file     Emotionally abused: Not on file     Physically abused: Not on file Forced sexual activity: Not on file   Other Topics Concern    Not on file   Social History Narrative    Not on file         ALLERGIES: Morphine    Review of Systems   Genitourinary: Positive for pelvic pain. All other systems reviewed and are negative. Vitals:    08/10/20 2224 08/11/20 0000 08/11/20 0726 08/11/20 1450   BP: 114/62 144/57 112/61 118/62   Pulse: 67 67 62 60   Resp:  16 16 16   Temp:  98.7 °F (37.1 °C) 97.7 °F (36.5 °C) 97.8 °F (36.6 °C)   SpO2:       Weight:       Height:                Physical Exam  Vitals signs and nursing note reviewed. Constitutional:       Appearance: Normal appearance. Eyes:      Extraocular Movements: Extraocular movements intact. Pupils: Pupils are equal, round, and reactive to light. Neck:      Musculoskeletal: Normal range of motion and neck supple. Cardiovascular:      Rate and Rhythm: Tachycardia present. Pulmonary:      Effort: Tachypnea present. Abdominal:      General: There is distension. Comments: Gravid uterus   Genitourinary:     Comments: Deferred to OB hospitalist  Skin:     General: Skin is warm and dry. Neurological:      Mental Status: She is alert and oriented to person, place, and time. MDM  Number of Diagnoses or Management Options  Diagnosis management comments: A/P: Impending delivery, labor. 82-year-old female presenting in labor patient brought to bed 2 in ED OB hospitalist found patient to be 6 cm dilated appropriate to transfer to labor and delivery for further management. Appreciate L&D assistance patient to go directly to L&D.     Patient Progress  Patient progress: stable         Procedures

## 2020-08-11 NOTE — PROGRESS NOTES
Bedside and Verbal shift change report given to Rosalba Peterson RN (oncoming nurse) by Frieda Smallwood RNC (offgoing nurse). Report included the following information SBAR, Kardex, Intake/Output, MAR and Recent Results.

## 2020-08-11 NOTE — LACTATION NOTE
This note was copied from a baby's chart. Experienced breastfeeding mother. This is her third baby to breast feed. Mother states her new baby is latching on well and breastfeeding well. She desires and early discharge. Baby last breast fed at 0800. Instructed mother to call [facility] for breastfeeding assistance when baby nurses again. Discussed with mother her plan for feeding. Reviewed the benefits of exclusive breast milk feeding during the hospital stay. Informed her of the risks of using formula to supplement in the first few days of life as well as the benefits of successful breast milk feeding; referred her to the Breastfeeding booklet about this information. She acknowledges understanding of information reviewed and states that it is her plan to breastfeed her infant. Will support her choice and offer additional information as needed. Encouraged mom to attempt feeding with baby led feeding cues. Just as sucking on fingers, rooting, mouthing. Looking for 8-12 feedings in 24 hours. Don't limit baby at breast, allow baby to come of breast on it's own. Baby may want to feed  often and may increase number of feedings on second day of life. Skin to skin encouraged. If baby doesn't nurse,  Mom should  hand express  10-20 drops of colostrum and drip into baby's mouth, or give to baby by finger feeding, cup feeding, or spoon feeding at least every 2-3 hours. Reviewed breastfeeding basics:  Supply and demand,  stomach size, early  Feeding cues, skin to skin, positioning and baby led latch-on, assymetrical latch with signs of good, deep latch vs shallow, feeding frequency and duration, and log sheet for tracking infant feedings and output. Breastfeeding Booklet and Warm line information given. Discussed typical  weight loss and the importance of infant weight checks with pediatrician 1-2 post discharge.      Care for sore/tender nipples discussed:  ways to improve positioning and latch practiced and discussed, hand express colostrum after feedings and let air dry, light application of lanolin, hydrogel pads, seek comfortable laid back feeding position, start feedings on least sore side first.    Engorgement Care Guidelines:  Reviewed how milk is made and normal phases of milk production. Taught care of engorged breasts - frequent breastfeeding encouraged, cool packs and motrin as tolerated. Anticipatory guidance shared. Discussed eating a healthy diet. Instructed mother to eat a variety of foods in order to get a well balanced diet. She should consume an extra 500 calories per day (more than her non-pregnant requirement.) These extra calories will help provide energy needed for optimal breast milk production. Mother also encouraged to \"drink to thirst\" and it is recommended that she drink fluids such as water, fruit/vegetable juice. Nutritious snacks should be available so that she can eat throughout the day to help satisfy her hunger and maintain a good milk supply. Discussed pumping/storage and preparation of expressed breast milk for baby. Mother will successfully establish breastfeeding by feeding in response to early feeding cues   or wake every 3h, will obtain deep latch, and will keep log of feedings/output. Taught to BF at hunger cues and or q 2-3 hrs and to offer 10-20 drops of hand expressed colostrum at any non-feeds. Breast Assessment  Left Breast: Medium  Left Nipple: Everted, Intact  Right Breast: Medium  Right Nipple: Everted, Intact  Breast- Feeding Assessment  Attends Breast-Feeding Classes: No  Breast-Feeding Experience: Yes  Breast Trauma/Surgery: No  Type/Quality: Good(Per mother)  Lactation Consultant Visits  Breast-Feedings: (Mother last breast fed baby at 0800 for 15/15 minutes. Instructed mother to call [phone] when baby breastfeeds again)  Mother/Infant Observation  Infant Observation: Frenulum checked     Mother given breastfeeding handouts and LC#.

## 2020-08-11 NOTE — DISCHARGE INSTRUCTIONS
POSTPARTUM DISCHARGE INSTRUCTIONS       Name:  Flip Moya  YOB: 1990  Admission Diagnosis:  Pregnancy [Z34.90]     Discharge Diagnosis:    Problem List as of 8/11/2020 Date Reviewed: 3/20/2019          Codes Class Noted - Resolved    Pregnancy ICD-10-CM: Z34.90  ICD-9-CM: V22.2  8/10/2020 - Present        Ureteral stone with hydronephrosis ICD-10-CM: N13.2  ICD-9-CM: 592.1, 416  3/20/2019 - Present        Labor and delivery, indication for care ICD-10-CM: O75.9  ICD-9-CM: 659.90  6/5/2018 - Present        Irregular contractions ICD-10-CM: O62.2  ICD-9-CM: 661.20  4/11/2018 - Present        RESOLVED: Excess weight gain in pregnancy ICD-10-CM: O26.00  ICD-9-CM: 646.10  12/13/2013 - 1/2/2014        RESOLVED: Supervision of normal first pregnancy ICD-10-CM: Z34.00  ICD-9-CM: V22.0  5/23/2013 - 1/2/2014    Overview Addendum 10/21/2013  3:28 PM by Nico Leon     Routine midwifery care   Nursing school part time  [employer] [address]                  Attending Physician:  Marek Yepez MD    Delivery Type:  Vaginal Childbirth: What To Expect At Home    Your Recovery: Your body will slowly heal in the next few weeks. It is easy to get too tired and overwhelmed during the first weeks after your baby is born. Changes in your hormones can shift your mood without warning. You may find it hard to meet the extra demands on your energy and time. Take it easy on yourself. Follow-up care is a key part of your treatment and safety. Be sure to make and go to all appointments, and call your doctor if you are having problems. It's also a good idea to know your test results and keep a list of the medicines you take. How can you care for yourself at home? Vaginal bleeding and cramps  · After delivery, you will have a bloody discharge from the vagina. This will turn pink within a week and then white or yellow after about 10 days.  It may last for 2 to 4 weeks or longer, until the uterus has healed. Use pads instead of tampons until you stop bleeding. · Do not worry if you pass some blood clots, as long as they are smaller than a golf ball. If you have a tear or stitches in your vaginal area, change the pad at least every 4 hours to prevent soreness and infection. · You may have cramps for the first few days after childbirth. These are normal and occur as the uterus shrinks to normal size. Take an over-the-counter pain medicine, such as acetaminophen (Tylenol), ibuprofen (Advil, Motrin), or naproxen (Aleve), for cramps. Read and follow all instructions on the label. Do not take aspirin, because it can cause more bleeding. Do not take acetaminophen (Tylenol) and other acetaminophen containing medications (i.e. Percocet) at the same time. Breast fullness  · Your breasts may overfill (engorge) in the first few days after delivery. To help milk flow and to relieve pain, warm your breasts in the shower or by using warm, moist towels before nursing. · If you are not nursing, do not put warmth on your breasts or touch your breasts. Wear a tight bra or sports bra and use ice until the fullness goes away. This usually takes 2 to 3 days. · Put ice or a cold pack on your breast after nursing to reduce swelling and pain. Put a thin cloth between the ice and your skin. Activity  · Eat a balanced diet. Do not try to lose weight by cutting calories. Keep taking your prenatal vitamins, or take a multivitamin. · Get as much rest as you can. Try to take naps when your baby sleeps during the day. · Get some exercise every day. But do not do any heavy exercise until your doctor says it is okay. · Wait until you are healed (about 4 to 6 weeks) before you have sexual intercourse. Your doctor will tell you when it is okay to have sex. · Talk to your doctor about birth control. You can get pregnant even before your period returns. Also, you can get pregnant while you are breast-feeding.     Mental Health  · Many women get the \"baby blues\" during the first few days after childbirth. You may lose sleep, feel irritable, and cry easily. You may feel happy one minute and sad the next. Hormone changes are one cause of these emotional changes. Also, the demands of a new baby, along with visits from relatives or other family needs, add to a mother's stress. The \"baby blues\" often peak around the fourth day. Then they ease up in less than 2 weeks. · If your moodiness or anxiety lasts for more than 2 weeks, or if you feel like life is not worth living, you may have postpartum depression. This is different for each mother. Some mothers with serious depression may worry intensely about their infant's well-being. Others may feel distant from their child. Some mothers might even feel that they might harm their baby. A mother may have signs of paranoia, wondering if someone is watching her. · With all the changes in your life, you may not know if you are depressed. Pregnancy sometimes causes changes in how you feel that are similar to the symptoms of depression. · Symptoms of depression include:  · Feeling sad or hopeless and losing interest in daily activities. These are the most common symptoms of depression. · Sleeping too much or not enough. · Feeling tired. You may feel as if you have no energy. · Eating too much or too little. · [employer] (PSI) offers a Warm line; Chat with the Expert phone sessions; Information and Articles about Pregnancy and Postpartum Mood Disorders; Comprehensive List of Free Support Groups; Knowledgeable local coordinators who will offer support, information, and resources; Guide to Resources on NotesFirst; Calendar of events in the  mood disorders community; Latest News and Research; and [facility] [address] for [employer]. Remember - You are not alone; You are not to blame; With help, you will be well. 5-379-703-PPD(2412). WWW. POSTPARTUM. NET · Writing or talking about death, such as writing suicide notes or talking about guns, knives, or pills. Keep the numbers for these national suicide hotlines: 5-880-192-TALK (1-569.293.4913) and 9-755-KJJVLYA (6-420.357.3220). If you or someone you know talks about suicide or feeling hopeless, get help right away. Constipation and Hemorrhoids  · Drink plenty of fluids, enough so that your urine is light yellow or clear like water. If you have kidney, heart, or liver disease and have to limit fluids, talk with your doctor before you increase the amount of fluids you drink. · Eat plenty of fiber each day. Have a bran muffin or bran cereal for breakfast, and try eating a piece of fruit for a mid-afternoon snack. · For painful, itchy hemorrhoids, put ice or a cold pack on the area several times a day for 10 minutes at a time. Follow this by putting a warm compress on the area for another 10 to 20 minutes or by sitting in a shallow, warm bath. When should you call for help? Call 911 anytime you think you may need emergency care. For example, call if:  · You are thinking of hurting yourself, your baby, or anyone else. · You passed out (lost consciousness). · You have symptoms of a blood clot in your lung (called a pulmonary embolism). These may include:    · Sudden chest pain. · Trouble breathing. · Coughing up blood. Call your doctor now or seek immediate medical care if:  · You have severe vaginal bleeding. · You are soaking through a pad each hour for 2 or more hours. · Your vaginal bleeding seems to be getting heavier or is still bright red 4 days after delivery. · You are dizzy or lightheaded, or you feel like you may faint. · You are vomiting or cannot keep fluids down. · You have a fever. · You have new or more belly pain. · You pass tissue (not just blood). · Your vaginal discharge smells bad. · Your belly feels tender or full and hard.   · Your breasts are continuously painful or red.  · You feel sad, anxious, or hopeless for more than a few days. · You have sudden, severe pain in your belly. · You have symptoms of a blood clot in your leg (called a deep vein thrombosis),          such as:  · Pain in your calf, back of the knee, thigh, or groin. · Redness and swelling in your leg or groin. · You have symptoms of preeclampsia, such as:  · Sudden swelling of your face, hands, or feet. · New vision problems (such as dimness or blurring). · A severe headache. · Your blood pressure is higher than it should be or rises suddenly. · You have new nausea or vomiting. Watch closely for changes in your health, and be sure to contact your doctor if you have any problems. Additional Information:  Not applicable    These are general instructions for a healthy lifestyle:    No smoking/ No tobacco products/ Avoid exposure to second hand smoke    Surgeon General's Warning:  Quitting smoking now greatly reduces serious risk to your health. Obesity, smoking, and sedentary lifestyle greatly increases your risk for illness    A healthy diet, regular physical exercise & weight monitoring are important for maintaining a healthy lifestyle    Recognize signs and symptoms of STROKE:    F-face looks uneven    A-arms unable to move or move unevenly    S-speech slurred or non-existent    T-time-call 911 as soon as signs and symptoms begin - DO NOT go       back to bed or wait to see if you get better - TIME IS BRAIN. I have had the opportunity to make my options or choices for discharge. I have received and understand these instructions.

## 2020-08-11 NOTE — DISCHARGE SUMMARY
Obstetrical Discharge Summary     Name: Silke Dougherty MRN: [id number]  SSN: [id number]    YOB: 1990  Age: 31 y.o. Sex: female      Admit Date: 8/10/2020    Discharge Date: 2020     Admitting Physician: Jennifer Gudino MD     Attending Physician:  Derek Guerra MD     Admission Diagnoses: Pregnancy [Z34.90]    Procedure Performed:  * No surgery found *  Surgical     Used for misc procedures  Discharge Diagnoses:   Information for the patient's :  Loretta Limao Female Naomie Shabazz [[id number]]   Delivery of a 3.125 kg female infant via Vaginal, Spontaneous on 8/10/2020 at 7:47 PM  by Corina Goldmann. Apgars were 8  and 9 . Additional Diagnoses:   Hospital Problems  Date Reviewed: 3/20/2019          Codes Class Noted POA    Pregnancy ICD-10-CM: Z34.90  ICD-9-CM: V22.2  8/10/2020 Unknown             Lab Results   Component Value Date/Time    Rubella, External immune 01/10/2020    GrBStrep, External negative 2020       Hospital Course: Normal hospital course following the delivery. Patient Disposition: Home      Followup Care:  Discharge Condition: good  Activity as tolerated  Regular Diet  Pelvic rest until 6 week follow up     Patient Instructions:   Current Discharge Medication List      START taking these medications    Details   ibuprofen (MOTRIN) 800 mg tablet Take 1 Tab by mouth every eight (8) hours as needed for Pain. Qty: 60 Tab, Refills: 1      oxyCODONE-acetaminophen (PERCOCET) 5-325 mg per tablet Take 1 Tab by mouth every six (6) hours as needed for Pain for up to 7 days. Max Daily Amount: 4 Tabs. Qty: 12 Tab, Refills: 0    Associated Diagnoses: Labor and delivery, indication for care         CONTINUE these medications which have NOT CHANGED    Details   PNV No.40-Iron Fum-FA Cmb No.1 27-1 mg tab Take  by mouth.          STOP taking these medications       acetaminophen (TylenoL) 325 mg tablet Comments:   Reason for Stopping:         ketorolac (TORADOL) 10 mg tablet Comments:   Reason for Stopping:         HYDROcodone-acetaminophen (NORCO) 5-325 mg per tablet Comments:   Reason for Stopping:         oxybutynin (DITROPAN) 5 mg tablet Comments:   Reason for Stopping:         tamsulosin (FLOMAX) 0.4 mg capsule Comments:   Reason for Stopping:               Reference my discharge instructions. Follow-up Appointments   Procedures    FOLLOW UP VISIT Appointment in: 6 Weeks     Standing Status:   Standing     Number of Occurrences:   1     Order Specific Question:   Appointment in     Answer:   6 Weeks        Signed By:  Kaveh Scott MD     2020         Obstetrical Discharge Summary     Name: Bill Fuller MRN: [id number]  SSN: [id number]    YOB: 1990  Age: 31 y.o. Sex: female      Admit Date: 8/10/2020    Discharge Date: 2020     Admitting Physician: Kaveh Scott MD     Attending Physician:  Dejan Harper MD     Admission Diagnoses: Pregnancy [Z34.90]    Discharge Diagnoses:   Information for the patient's :  Michael Bills Female Neva Riojas [[id number]]   Delivery of a 3.125 kg female infant via Vaginal, Spontaneous on 8/10/2020 at 7:47 PM  by Augustina Grullon. Apgars were 8  and 9 . Additional Diagnoses:   Hospital Problems  Date Reviewed: 3/20/2019          Codes Class Noted POA    Pregnancy ICD-10-CM: Z34.90  ICD-9-CM: V22.2  8/10/2020 Unknown             Lab Results   Component Value Date/Time    Rubella, External immune 01/10/2020    GrBStrep, External negative 2020       Hospital Course: Normal hospital course following the delivery. Disposition at Discharge: Home or self care    Discharged Condition: Stable    Patient Instructions:   Current Discharge Medication List      START taking these medications    Details   ibuprofen (MOTRIN) 800 mg tablet Take 1 Tab by mouth every eight (8) hours as needed for Pain.   Qty: 60 Tab, Refills: 1      oxyCODONE-acetaminophen (PERCOCET) 5-325 mg per tablet Take 1 Tab by mouth every six (6) hours as needed for Pain for up to 7 days. Max Daily Amount: 4 Tabs. Qty: 12 Tab, Refills: 0    Associated Diagnoses: Labor and delivery, indication for care         CONTINUE these medications which have NOT CHANGED    Details   PNV No.40-Iron Fum-FA Cmb No.1 27-1 mg tab Take  by mouth. STOP taking these medications       acetaminophen (TylenoL) 325 mg tablet Comments:   Reason for Stopping:         ketorolac (TORADOL) 10 mg tablet Comments:   Reason for Stopping:         HYDROcodone-acetaminophen (NORCO) 5-325 mg per tablet Comments:   Reason for Stopping:         oxybutynin (DITROPAN) 5 mg tablet Comments:   Reason for Stopping:         tamsulosin (FLOMAX) 0.4 mg capsule Comments:   Reason for Stopping:               Reference my discharge instructions.     Follow-up Appointments   Procedures    FOLLOW UP VISIT Appointment in: 6 Weeks     Standing Status:   Standing     Number of Occurrences:   1     Order Specific Question:   Appointment in     Answer:   6 Weeks        Signed By:  Tahmina Huber MD     August 11, 2020

## 2020-08-11 NOTE — ANESTHESIA POSTPROCEDURE EVALUATION
* No procedures listed *.    spinal, epidural    Anesthesia Post Evaluation      Multimodal analgesia: multimodal analgesia not used between 6 hours prior to anesthesia start to PACU discharge  Patient location during evaluation: PACU  Patient participation: complete - patient participated  Level of consciousness: awake and alert  Pain score: 0  Pain management: adequate  Airway patency: patent  Anesthetic complications: no  Cardiovascular status: hemodynamically stable and acceptable  Respiratory status: acceptable  Hydration status: acceptable  Comments: Patient seen and evaluated; still without pain. Post anesthesia nausea and vomiting:  none      INITIAL Post-op Vital signs: No vitals data found for the desired time range.

## 2020-08-11 NOTE — PROGRESS NOTES
Post-Partum Day Number 1 Progress Note    Melida K Shellie     Assessment: Doing well, post partum day 1  A+/Rubella immune     Plan:  1. Continue routine postpartum and perineal care as well as maternal education. Information for the patient's :  [place] [[id number]]   Vaginal, Spontaneous    Patient doing well without significant complaint. Voiding without difficulty, normal lochia. Patient desires 24 hour discharge tonight, pending baby approval.     Vitals:  Visit Vitals  /61 (BP 1 Location: Left arm, BP Patient Position: At rest;Post activity; Sitting)   Pulse 62   Temp 97.7 °F (36.5 °C)   Resp 16   Ht 5' 7\" (1.702 m)   Wt 86.2 kg (190 lb)   SpO2 93%   Breastfeeding Unknown   BMI 29.76 kg/m²     Temp (24hrs), Av.2 °F (36.8 °C), Min:97.7 °F (36.5 °C), Max:98.7 °F (37.1 °C)        Exam:   Patient without distress. Abdomen soft, fundus firm, nontender                Perineum with normal lochia noted. Lower extremities are negative for swelling, cords or tenderness.     Labs:     Lab Results   Component Value Date/Time    WBC 11.9 (H) 08/10/2020 06:35 PM    WBC 7.0 2019 05:04 PM    WBC 8.2 2019 10:19 AM    WBC 9.5 2018 01:59 PM    WBC 15.3 (H) 2013 10:20 PM    WBC 12.2 (H) 2013 01:20 PM    WBC 12.8 (H) 2013 09:22 PM    WBC 11.3 (H) 10/14/2013 01:12 PM    WBC 12.3 (H) 2013 02:15 PM    WBC 8.8 06/15/2013 10:39 AM    HGB 14.1 08/10/2020 06:35 PM    HGB 13.4 2019 05:04 PM    HGB 13.3 2019 10:19 AM    HGB 13.3 2018 01:59 PM    HGB 12.8 2013 10:20 PM    HGB 13.0 2013 01:20 PM    HGB 12.6 2013 09:22 PM    HGB 12.4 10/14/2013 01:12 PM    HGB 12.7 2013 02:15 PM    HGB 12.7 06/15/2013 10:39 AM    HCT 40.7 08/10/2020 06:35 PM    HCT 40.6 2019 05:04 PM    HCT 40.6 2019 10:19 AM    HCT 39.6 2018 01:59 PM    HCT 36.6 2013 10:20 PM    HCT 37.5 2013 01:20 PM    HCT 36.3 12/05/2013 09:22 PM    HCT 37.9 10/14/2013 01:12 PM    HCT 36.0 09/02/2013 02:15 PM    HCT 37.8 06/15/2013 10:39 AM    PLATELET 908 54/57/8736 06:35 PM    PLATELET 058 96/13/0199 05:04 PM    PLATELET 310 20/03/2693 10:19 AM    PLATELET 697 05/19/1857 01:59 PM    PLATELET 304 85/53/5734 10:20 PM    PLATELET 859 57/97/7061 01:20 PM    PLATELET 081 36/11/6626 09:22 PM    PLATELET 230 81/83/9803 01:12 PM    PLATELET 141 84/08/2213 02:15 PM    PLATELET 682 87/65/1932 10:39 AM    Hgb, External 12.4  g/dL  -  [facility] 10/14/2013    Hgb, External 12.7  g/dL  -  [facility] 06/18/2013    Hct, External 37.9  %  -  [facility] 10/14/2013    Hct, External 37.8  %  -  [facility] 06/18/2013    Platelet cnt., External 251  x10E3/uL  -  [facility] 10/14/2013    Platelet cnt., External 287  x10E3/uL  -  [facility] 06/18/2013       Recent Results (from the past 24 hour(s))   CBC WITH AUTOMATED DIFF    Collection Time: 08/10/20  6:35 PM   Result Value Ref Range    WBC 11.9 (H) 3.6 - 11.0 K/uL    RBC 4.21 3.80 - 5.20 M/uL    HGB 14.1 11.5 - 16.0 g/dL    HCT 40.7 35.0 - 47.0 %    MCV 96.7 80.0 - 99.0 FL    MCH 33.5 26.0 - 34.0 PG    MCHC 34.6 30.0 - 36.5 g/dL    RDW 12.9 11.5 - 14.5 %    PLATELET 894 485 - 615 K/uL    MPV 10.4 8.9 - 12.9 FL    NRBC 0.0 0  WBC    ABSOLUTE NRBC 0.00 0.00 - 0.01 K/uL    NEUTROPHILS 71 32 - 75 %    LYMPHOCYTES 19 12 - 49 %    MONOCYTES 8 5 - 13 %    EOSINOPHILS 1 0 - 7 %    BASOPHILS 0 0 - 1 %    IMMATURE GRANULOCYTES 1 (H) 0.0 - 0.5 %    ABS. NEUTROPHILS 8.5 (H) 1.8 - 8.0 K/UL    ABS. LYMPHOCYTES 2.2 0.8 - 3.5 K/UL    ABS. MONOCYTES 1.0 0.0 - 1.0 K/UL    ABS. EOSINOPHILS 0.1 0.0 - 0.4 K/UL    ABS. BASOPHILS 0.0 0.0 - 0.1 K/UL    ABS. IMM.  GRANS. 0.1 (H) 0.00 - 0.04 K/UL    DF AUTOMATED

## 2020-08-12 NOTE — PROGRESS NOTES
I have reviewed discharge instructions with the patient. The patient verbalized understanding. Pt left with all belongings and all questions answered. PT left floor at 2138 with spouse and baby.

## 2022-08-16 NOTE — DISCHARGE INSTRUCTIONS
Weeks 30 to 32 of Your Pregnancy: Care Instructions  Your Care Instructions    You have made it to the final months of your pregnancy. By now, your baby is really starting to look like a baby, with hair and plump skin. As you enter the final weeks of pregnancy, the reality of having a baby may start to set in. This is the time to settle on a name, get your household in order, set up a safe nursery, and find quality  if needed. Doing these things in advance will allow you to focus on caring for and enjoying your new baby. You may also want to have a tour of your hospital's labor and delivery unit to get a better idea of what to expect while you are in the hospital.  During these last months, it is very important to take good care of yourself and pay attention to what your body needs. If your doctor says it is okay for you to work, don't push yourself too hard. Use the tips provided in this care sheet to ease heartburn and care for varicose veins. If you haven't already had the Tdap shot during this pregnancy, talk to your doctor about getting it. It will help protect your  against pertussis infection. Follow-up care is a key part of your treatment and safety. Be sure to make and go to all appointments, and call your doctor if you are having problems. It's also a good idea to know your test results and keep a list of the medicines you take. How can you care for yourself at home? Pay attention to your baby's movements  · You should feel your baby move several times every day. · Your baby now turns less, and kicks and jabs more. · Your baby sleeps 20 to 45 minutes at a time and is more active at certain times of day. · If your doctor wants you to count your baby's kicks:  ¨ Empty your bladder, and lie on your side or relax in a comfortable chair. ¨ Write down your start time. ¨ Pay attention only to your baby's movements. Count any movement except hiccups.   ¨ After you have counted 10 Plan: Patient just restarted Spironolactone 50 mg for 1 week.  Patient did react well to Arazlo sample, she had a strong irritated, reaction on face.  She is taking Spironolactone 50 qd currently.  She will use samples first and let us know which she prefers.  She will start with Aklief and then try Onexton.  Accutane was also discussed and she stated that she did a 5 month round of Accutane in 2016.  She is using an over the counter salicylic exfoliating pad every other day. movements, write down your stop time. ¨ Write down how many minutes it took for your baby to move 10 times. ¨ If an hour goes by and you have not recorded 10 movements, have something to eat or drink and then count for another hour. If you do not record 10 movements in either hour, call your doctor. Ease heartburn  · Eat small, frequent meals. · Do not eat chocolate, peppermint, or very spicy foods. Avoid drinks with caffeine, such as coffee, tea, and sodas. · Avoid bending over or lying down after meals. · Talk a short walk after you eat. · If heartburn is a problem at night, do not eat for 2 hours before bedtime. · Take antacids like Mylanta, Maalox, Rolaids, or Tums. Do not take antacids that have sodium bicarbonate. Care for varicose veins  · Varicose veins are blood vessels that stretch out with the extra blood during pregnancy. Your legs may ache or throb. Most varicose veins will go away after the birth. · Avoid standing for long periods of time. Sit with your legs crossed at the ankles, not the knees. · Sit with your feet propped up. · Avoid tight clothing or stockings. Wear support hose. · Exercise regularly. Try walking for at least 30 minutes a day. Where can you learn more? Go to http://burke-armando.info/. Enter O928 in the search box to learn more about \"Weeks 30 to 32 of Your Pregnancy: Care Instructions. \"  Current as of: 2017  Content Version: 11.4  © 4635-2010 [employer]. Care instructions adapted under license by [employer] (which disclaims liability or warranty for this information). If you have questions about a medical condition or this instruction, always ask your healthcare professional. [place] [address] any warranty or liability for your use of this information.  Labor: Care Instructions  Your Care Instructions     labor is the start of labor between 21 and 36 weeks of pregnancy.  A Samples Given: Plan A: Aklief 2-3 nights a week. If needed try Plan B: Onexton full-term pregnancy lasts 37 to 42 weeks. In labor, the uterus contracts to open the cervix. This is the first stage of childbirth.  labor can be caused by a problem with the baby, the mother, or both. Often the cause is not known. In some cases, doctors use medicines to try to delay labor until 29 or more weeks of pregnancy. By this time, a baby has grown enough so that problems are not likely. In some cases-such as with a serious infection-it is healthier for the baby to be born early. Your treatment will depend on how far along you are in your pregnancy and on your health and your baby's health. Follow-up care is a key part of your treatment and safety. Be sure to make and go to all appointments, and call your doctor if you are having problems. It's also a good idea to know your test results and keep a list of the medicines you take. How can you care for yourself at home? · If your doctor prescribed medicines, take them exactly as directed. Call your doctor if you think you are having a problem with your medicine. · Rest until your doctor advises you about activity. He or she will tell you if you should stay in bed most of the time. You may need to arrange for  if you have young children. · Do not have sexual intercourse unless your doctor says it is safe. · Use pads, not tampons, if you have vaginal bleeding. · Make sure to drink plenty of fluids. Dehydration can lead to contractions. If you have kidney, heart, or liver disease and have to limit fluids, talk with your doctor before you increase the amount of fluids you drink. · Do not smoke or allow others to smoke around you. If you need help quitting, talk to your doctor about stop-smoking programs and medicines. These can increase your chances of quitting for good. When should you call for help? Call 911 anytime you think you may need emergency care. For example, call if:  ? · You passed out (lost consciousness).    ? · You have severe vaginal bleeding. ? · You have severe pain in your belly or pelvis. ? · You have had fluid gushing or leaking from your vagina and you know or think the umbilical cord is bulging into your vagina. If this happens, immediately get down on your knees so your rear end (buttocks) is higher than your head. This will decrease the pressure on the cord until help arrives. ?Call your doctor now or seek immediate medical care if:  ? · You have signs of preeclampsia, such as:  ¨ Sudden swelling of your face, hands, or feet. ¨ New vision problems (such as dimness or blurring). ¨ A severe headache. ? · You have any vaginal bleeding. ? · You have belly pain or cramping. ? · You have a fever. ? · You have had regular contractions (with or without pain) for an hour. This means that you have 6 or more within 1 hour after you change your position and drink fluids. ? · You have a sudden release of fluid from the vagina. ? · You have low back pain or pelvic pressure that does not go away. ? · You notice that your baby has stopped moving or is moving much less than normal.   ? Watch closely for changes in your health, and be sure to contact your doctor if you have any problems. Where can you learn more? Go to http://burke-armando.info/. Enter Q400 in the search box to learn more about \" Labor: Care Instructions. \"  Current as of: 2017  Content Version: 11.4  © 7473-8495 [employer]. Care instructions adapted under license by [employer] (which disclaims liability or warranty for this information). If you have questions about a medical condition or this instruction, always ask your healthcare professional. [place] [address] any warranty or liability for your use of this information. [place] Ruiz Contractions: Care Instructions  Your Care Instructions    [place] contractions prepare your uterus for labor.  Think of them as a \"warm-up\" exercise that your body does. You may begin to feel them between the 28th and 30th weeks of your pregnancy. But they start as early as the 20th week. [place] contractions usually occur more often during the ninth month. They may go away when you are active and return when you rest. These contractions are like mild contractions of true labor, but they occur less often. (You feel fewer than 8 in an hour.) They don't cause your cervix to open. It may be hard for you to tell the difference between [facility] contractions and true labor, especially in your first pregnancy. Follow-up care is a key part of your treatment and safety. Be sure to make and go to all appointments, and call your doctor if you are having problems. It's also a good idea to know your test results and keep a list of the medicines you take. How can you care for yourself at home? · Try a warm bath to help relieve muscle tension and reduce pain. · Change positions every 30 minutes. Take breaks if you must sit for a long time. Get up and walk around. · Drink plenty of water, enough so that your urine is light yellow or clear like water. · Taking short walks may help you feel better. Your doctor needs to check any contractions that are getting stronger or closer together. Where can you learn more? Go to http://burek-armando.info/. Enter 668 [id number] in the search box to learn more about \"[place] Contractions: Care Instructions. \"  Current as of: March 16, 2017  Content Version: 11.4  © 7721-2979 [employer] Care instructions adapted under license by [employer] (which disclaims liability or warranty for this information). If you have questions about a medical condition or this instruction, always ask your healthcare professional. [place] [address] any warranty or liability for your use of this information.           Pregnancy Precautions: Care Instructions  Your Care Instructions    There is no sure way to prevent labor before your due date ( labor) or to prevent most other pregnancy problems. But there are things you can do to increase your chances of a healthy pregnancy. Go to your appointments, follow your doctor's advice, and take good care of yourself. Eat well, and exercise (if your doctor agrees). And make sure to drink plenty of water. Follow-up care is a key part of your treatment and safety. Be sure to make and go to all appointments, and call your doctor if you are having problems. It's also a good idea to know your test results and keep a list of the medicines you take. How can you care for yourself at home? · Make sure you go to your prenatal appointments. At each visit, your doctor will check your blood pressure. Your doctor will also check to see if you have protein in your urine. High blood pressure and protein in urine are signs of preeclampsia. This condition can be dangerous for you and your baby. · Drink plenty of fluids, enough so that your urine is light yellow or clear like water. Dehydration can cause contractions. If you have kidney, heart, or liver disease and have to limit fluids, talk with your doctor before you increase the amount of fluids you drink. · Tell your doctor right away if you notice any symptoms of an infection, such as:  ¨ Burning when you urinate. ¨ A foul-smelling discharge from your vagina. ¨ Vaginal itching. ¨ Unexplained fever. ¨ Unusual pain or soreness in your uterus or lower belly. · Eat a balanced diet. Include plenty of foods that are high in calcium and iron. ¨ Foods high in calcium include milk, cheese, yogurt, almonds, and broccoli. ¨ Foods high in iron include red meat, shellfish, poultry, eggs, beans, raisins, whole-grain bread, and leafy green vegetables. · Do not smoke. If you need help quitting, talk to your doctor about stop-smoking programs and medicines.  These can increase your chances of quitting for good. · Do not drink alcohol or use illegal drugs. · Follow your doctor's directions about activity. Your doctor will let you know how much, if any, exercise you can do. · Ask your doctor if you can have sex. If you are at risk for early labor, your doctor may ask you to not have sex. · Take care to prevent falls. During pregnancy, your joints are loose, and your balance is off. Sports such as bicycling, skiing, or in-line skating can increase your risk of falling. And don't ride horses or motorcycles, dive, water ski, scuba dive, or parachute jump while you are pregnant. · Avoid getting very hot. Do not use saunas or hot tubs. Avoid staying out in the sun in hot weather for long periods. Take acetaminophen (Tylenol) to lower a high fever. · Do not take any over-the-counter or herbal medicines or supplements without talking to your doctor or pharmacist first.  When should you call for help? Call 911 anytime you think you may need emergency care. For example, call if:  ? · You passed out (lost consciousness). ? · You have severe vaginal bleeding. ? · You have severe pain in your belly or pelvis. ? · You have had fluid gushing or leaking from your vagina and you know or think the umbilical cord is bulging into your vagina. If this happens, immediately get down on your knees so your rear end (buttocks) is higher than your head. This will decrease the pressure on the cord until help arrives. ?Call your doctor now or seek immediate medical care if:  ? · You have signs of preeclampsia, such as:  ¨ Sudden swelling of your face, hands, or feet. ¨ New vision problems (such as dimness or blurring). ¨ A severe headache. ? · You have any vaginal bleeding. ? · You have belly pain or cramping. ? · You have a fever. ? · You have had regular contractions (with or without pain) for an hour.  This means that you have 8 or more within 1 hour or 4 or more in 20 minutes after you change your position and drink fluids. ? · You have a sudden release of fluid from your vagina. ? · You have low back pain or pelvic pressure that does not go away. ? · You notice that your baby has stopped moving or is moving much less than normal.   ? Watch closely for changes in your health, and be sure to contact your doctor if you have any problems. Where can you learn more? Go to http://burke-armando.info/. Enter [id number] in the search box to learn more about \"Pregnancy Precautions: Care Instructions. \"  Current as of: March 16, 2017  Content Version: 11.4  © 3748-3286 [employer]. Care instructions adapted under license by [employer] (which disclaims liability or warranty for this information). If you have questions about a medical condition or this instruction, always ask your healthcare professional. [address] any warranty or liability for your use of this information. Continue Regimen: [id number] mg qd Otc Regimen: Gentle cleanser and moisturizer Detail Level: Zone Discontinue Regimen: Arazlo

## (undated) DEVICE — BAG COLLECTION FLD OR-TBL NS --

## (undated) DEVICE — NITINOL STONE RETRIEVAL BASKET: Brand: ZERO TIP

## (undated) DEVICE — PACK,CYSTOSCOPY,PK III,SIRUS: Brand: MEDLINE

## (undated) DEVICE — GOWN,SIRUS,FABRNF,XL,20/CS: Brand: MEDLINE

## (undated) DEVICE — SKIN MARKER,REGULAR TIP WITH RULER AND LABELS: Brand: DEVON

## (undated) DEVICE — CONTAINER,SPECIMEN,OR STERILE,4OZ: Brand: MEDLINE

## (undated) DEVICE — OPEN-END URETERAL CATHETER: Brand: COOK

## (undated) DEVICE — STERILE POLYISOPRENE POWDER-FREE SURGICAL GLOVES: Brand: PROTEXIS

## (undated) DEVICE — MEDI-VAC NON-CONDUCTIVE SUCTION TUBING: Brand: CARDINAL HEALTH

## (undated) DEVICE — 4-PORT MANIFOLD: Brand: NEPTUNE 2

## (undated) DEVICE — Z DISCONTINUEDSOLUTION PREP 2OZ 10% POVIDONE IOD SCR CAP BTL

## (undated) DEVICE — ENDOSCOPIC VALVE WITH ADAPTER.: Brand: SURSEAL® II

## (undated) DEVICE — SOLUTION IRRIGATION H2O 0797305] ICU MEDICAL INC]

## (undated) DEVICE — Y-TYPE TUR IRRIGATION SET

## (undated) DEVICE — INFECTION CONTROL KIT SYS

## (undated) DEVICE — DILATOR/SHEATH SET: Brand: 8/10 DILATOR/SHEATH SET

## (undated) DEVICE — JELLY,LUBE,STERILE,FLIP TOP,TUBE,4-OZ: Brand: MEDLINE

## (undated) DEVICE — DEVON™ KNEE AND BODY STRAP 60" X 3" (1.5 M X 7.6 CM): Brand: DEVON

## (undated) DEVICE — KENDALL SCD EXPRESS SLEEVES, KNEE LENGTH, MEDIUM: Brand: KENDALL SCD

## (undated) DEVICE — GUIDEWIRE ENDOSCP L150CM DIA0.035IN TIP L15CM BENT PTFE

## (undated) DEVICE — LIGHT HANDLE: Brand: DEVON

## (undated) DEVICE — CYSTO/BLADDER IRRIGATION SET, REGULATING CLAMP

## (undated) DEVICE — SOLUTION IRRIG 3000ML 0.9% SOD CHL FLX CONT 0797208] ICU MEDICAL INC]

## (undated) DEVICE — GDWIRE UROL STR 150CM FLX TP -- BX/5 SENSOR

## (undated) DEVICE — SYR 10ML LUER LOK 1/5ML GRAD --